# Patient Record
Sex: MALE | Race: WHITE | NOT HISPANIC OR LATINO | Employment: PART TIME | URBAN - METROPOLITAN AREA
[De-identification: names, ages, dates, MRNs, and addresses within clinical notes are randomized per-mention and may not be internally consistent; named-entity substitution may affect disease eponyms.]

---

## 2017-01-03 ENCOUNTER — TRANSFERRED RECORDS (OUTPATIENT)
Dept: HEALTH INFORMATION MANAGEMENT | Facility: CLINIC | Age: 72
End: 2017-01-03

## 2017-02-14 ENCOUNTER — HOSPITAL ENCOUNTER (OUTPATIENT)
Facility: CLINIC | Age: 72
End: 2017-02-14
Attending: OPHTHALMOLOGY | Admitting: OPHTHALMOLOGY
Payer: MEDICARE

## 2017-03-23 ENCOUNTER — TRANSFERRED RECORDS (OUTPATIENT)
Dept: HEALTH INFORMATION MANAGEMENT | Facility: CLINIC | Age: 72
End: 2017-03-23

## 2017-04-05 ENCOUNTER — ANESTHESIA EVENT (OUTPATIENT)
Dept: SURGERY | Facility: CLINIC | Age: 72
End: 2017-04-05
Payer: MEDICARE

## 2017-04-05 ENCOUNTER — ANESTHESIA (OUTPATIENT)
Dept: SURGERY | Facility: CLINIC | Age: 72
End: 2017-04-05
Payer: MEDICARE

## 2017-04-05 ENCOUNTER — HOSPITAL ENCOUNTER (OUTPATIENT)
Facility: CLINIC | Age: 72
Discharge: HOME | End: 2017-04-05
Attending: OPHTHALMOLOGY | Admitting: OPHTHALMOLOGY
Payer: MEDICARE

## 2017-04-05 ENCOUNTER — SURGERY (OUTPATIENT)
Age: 72
End: 2017-04-05

## 2017-04-05 ENCOUNTER — APPOINTMENT (OUTPATIENT)
Dept: ADMISSION | Facility: CLINIC | Age: 72
End: 2017-04-05
Attending: OPHTHALMOLOGY
Payer: COMMERCIAL

## 2017-04-05 VITALS
TEMPERATURE: 97.6 F | WEIGHT: 204 LBS | RESPIRATION RATE: 16 BRPM | HEART RATE: 79 BPM | SYSTOLIC BLOOD PRESSURE: 132 MMHG | OXYGEN SATURATION: 94 % | HEIGHT: 68 IN | DIASTOLIC BLOOD PRESSURE: 82 MMHG | BODY MASS INDEX: 30.92 KG/M2

## 2017-04-05 PROCEDURE — V2787 ASTIGMATISM-CORRECT FUNCTION: HCPCS | Performed by: OPHTHALMOLOGY

## 2017-04-05 PROCEDURE — 27210794 ZZH OR GENERAL SUPPLY STERILE: Performed by: OPHTHALMOLOGY

## 2017-04-05 PROCEDURE — 25000128 H RX IP 250 OP 636: Performed by: NURSE ANESTHETIST, CERTIFIED REGISTERED

## 2017-04-05 PROCEDURE — 25800025 ZZH RX 258: Performed by: ANESTHESIOLOGY

## 2017-04-05 PROCEDURE — 71000028 ZZH EYE RECOVERY PHASE 2 EACH 15 MINS: Performed by: OPHTHALMOLOGY

## 2017-04-05 PROCEDURE — 37000008 ZZH ANESTHESIA TECHNICAL FEE, 1ST 30 MIN: Performed by: OPHTHALMOLOGY

## 2017-04-05 PROCEDURE — 25000132 ZZH RX MED GY IP 250 OP 250 PS 637: Mod: GY | Performed by: OPHTHALMOLOGY

## 2017-04-05 PROCEDURE — 40000170 ZZH STATISTIC PRE-PROCEDURE ASSESSMENT II: Performed by: OPHTHALMOLOGY

## 2017-04-05 PROCEDURE — 25000125 ZZHC RX 250: Performed by: OPHTHALMOLOGY

## 2017-04-05 PROCEDURE — 36000135 ZZH KERATOTOMY ARCUATE W FEMTOSECOND LASER/IMAGING FOR ATIOL: Performed by: OPHTHALMOLOGY

## 2017-04-05 PROCEDURE — V2632 POST CHMBR INTRAOCULAR LENS: HCPCS | Performed by: OPHTHALMOLOGY

## 2017-04-05 PROCEDURE — 25000125 ZZHC RX 250: Performed by: ANESTHESIOLOGY

## 2017-04-05 PROCEDURE — 36000101 ZZH EYE SURGERY LEVEL 3 1ST 30 MIN: Performed by: OPHTHALMOLOGY

## 2017-04-05 DEVICE — IMPLANTABLE DEVICE: Type: IMPLANTABLE DEVICE | Site: EYE | Status: FUNCTIONAL

## 2017-04-05 RX ORDER — BALANCED SALT SOLUTION 6.4; .75; .48; .3; 3.9; 1.7 MG/ML; MG/ML; MG/ML; MG/ML; MG/ML; MG/ML
SOLUTION OPHTHALMIC PRN
Status: DISCONTINUED | OUTPATIENT
Start: 2017-04-05 | End: 2017-04-05 | Stop reason: HOSPADM

## 2017-04-05 RX ORDER — DICLOFENAC SODIUM 1 MG/ML
1 SOLUTION/ DROPS OPHTHALMIC
Status: COMPLETED | OUTPATIENT
Start: 2017-04-05 | End: 2017-04-05

## 2017-04-05 RX ORDER — PHENYLEPHRINE HYDROCHLORIDE 25 MG/ML
1 SOLUTION/ DROPS OPHTHALMIC
Status: COMPLETED | OUTPATIENT
Start: 2017-04-05 | End: 2017-04-05

## 2017-04-05 RX ORDER — MOXIFLOXACIN 5 MG/ML
1 SOLUTION/ DROPS OPHTHALMIC
Status: COMPLETED | OUTPATIENT
Start: 2017-04-05 | End: 2017-04-05

## 2017-04-05 RX ORDER — PROPARACAINE HYDROCHLORIDE 5 MG/ML
1 SOLUTION/ DROPS OPHTHALMIC ONCE
Status: DISCONTINUED | OUTPATIENT
Start: 2017-04-05 | End: 2017-04-05 | Stop reason: HOSPADM

## 2017-04-05 RX ORDER — PHENYLEPHRINE HYDROCHLORIDE 25 MG/ML
1 SOLUTION/ DROPS OPHTHALMIC
Status: DISCONTINUED | OUTPATIENT
Start: 2017-04-05 | End: 2017-04-05 | Stop reason: HOSPADM

## 2017-04-05 RX ORDER — SODIUM CHLORIDE, SODIUM LACTATE, POTASSIUM CHLORIDE, CALCIUM CHLORIDE 600; 310; 30; 20 MG/100ML; MG/100ML; MG/100ML; MG/100ML
500 INJECTION, SOLUTION INTRAVENOUS CONTINUOUS
Status: DISCONTINUED | OUTPATIENT
Start: 2017-04-05 | End: 2017-04-05 | Stop reason: HOSPADM

## 2017-04-05 RX ORDER — PROPARACAINE HYDROCHLORIDE 5 MG/ML
1 SOLUTION/ DROPS OPHTHALMIC ONCE
Status: COMPLETED | OUTPATIENT
Start: 2017-04-05 | End: 2017-04-05

## 2017-04-05 RX ORDER — DICLOFENAC SODIUM 1 MG/ML
1 SOLUTION/ DROPS OPHTHALMIC
Status: DISCONTINUED | OUTPATIENT
Start: 2017-04-05 | End: 2017-04-05 | Stop reason: HOSPADM

## 2017-04-05 RX ORDER — LIDOCAINE HYDROCHLORIDE 10 MG/ML
INJECTION, SOLUTION EPIDURAL; INFILTRATION; INTRACAUDAL; PERINEURAL PRN
Status: DISCONTINUED | OUTPATIENT
Start: 2017-04-05 | End: 2017-04-05 | Stop reason: HOSPADM

## 2017-04-05 RX ORDER — MOXIFLOXACIN 5 MG/ML
1 SOLUTION/ DROPS OPHTHALMIC
Status: DISCONTINUED | OUTPATIENT
Start: 2017-04-05 | End: 2017-04-05 | Stop reason: HOSPADM

## 2017-04-05 RX ORDER — TROPICAMIDE 10 MG/ML
1 SOLUTION/ DROPS OPHTHALMIC
Status: DISCONTINUED | OUTPATIENT
Start: 2017-04-05 | End: 2017-04-05 | Stop reason: HOSPADM

## 2017-04-05 RX ORDER — TROPICAMIDE 10 MG/ML
1 SOLUTION/ DROPS OPHTHALMIC
Status: COMPLETED | OUTPATIENT
Start: 2017-04-05 | End: 2017-04-05

## 2017-04-05 RX ORDER — ONDANSETRON 2 MG/ML
INJECTION INTRAMUSCULAR; INTRAVENOUS PRN
Status: DISCONTINUED | OUTPATIENT
Start: 2017-04-05 | End: 2017-04-05

## 2017-04-05 RX ADMIN — DICLOFENAC SODIUM 1 DROP: 1 SOLUTION/ DROPS OPHTHALMIC at 08:22

## 2017-04-05 RX ADMIN — PHENYLEPHRINE HYDROCHLORIDE 1 DROP: 2.5 SOLUTION/ DROPS OPHTHALMIC at 08:22

## 2017-04-05 RX ADMIN — MOXIFLOXACIN HYDROCHLORIDE 1 DROP: 5 SOLUTION/ DROPS OPHTHALMIC at 08:12

## 2017-04-05 RX ADMIN — LIDOCAINE HYDROCHLORIDE 0.3 ML: 10 INJECTION, SOLUTION EPIDURAL; INFILTRATION; INTRACAUDAL; PERINEURAL at 08:23

## 2017-04-05 RX ADMIN — PHENYLEPHRINE HYDROCHLORIDE 1 DROP: 2.5 SOLUTION/ DROPS OPHTHALMIC at 08:17

## 2017-04-05 RX ADMIN — MOXIFLOXACIN HYDROCHLORIDE 1 DROP: 5 SOLUTION/ DROPS OPHTHALMIC at 08:22

## 2017-04-05 RX ADMIN — POVIDONE-IODINE 1 DROP: 5 SOLUTION OPHTHALMIC at 09:21

## 2017-04-05 RX ADMIN — DICLOFENAC SODIUM 1 DROP: 1 SOLUTION/ DROPS OPHTHALMIC at 08:12

## 2017-04-05 RX ADMIN — PROPARACAINE HYDROCHLORIDE 1 DROP: 5 SOLUTION/ DROPS OPHTHALMIC at 08:12

## 2017-04-05 RX ADMIN — EPINEPHRINE 500 ML: 1 INJECTION INTRAMUSCULAR; INTRAVENOUS; SUBCUTANEOUS at 09:41

## 2017-04-05 RX ADMIN — TROPICAMIDE 1 DROP: 10 SOLUTION/ DROPS OPHTHALMIC at 08:22

## 2017-04-05 RX ADMIN — DICLOFENAC SODIUM 1 DROP: 1 SOLUTION/ DROPS OPHTHALMIC at 08:17

## 2017-04-05 RX ADMIN — LIDOCAINE HYDROCHLORIDE 1 ML: 10 INJECTION, SOLUTION EPIDURAL; INFILTRATION; INTRACAUDAL; PERINEURAL at 09:41

## 2017-04-05 RX ADMIN — TROPICAMIDE 1 DROP: 10 SOLUTION/ DROPS OPHTHALMIC at 08:17

## 2017-04-05 RX ADMIN — BALANCED SALT SOLUTION 15 ML: 6.4; .75; .48; .3; 3.9; 1.7 SOLUTION OPHTHALMIC at 09:41

## 2017-04-05 RX ADMIN — BALANCED SALT SOLUTION 15 ML: 6.4; .75; .48; .3; 3.9; 1.7 SOLUTION OPHTHALMIC at 09:27

## 2017-04-05 RX ADMIN — PHENYLEPHRINE HYDROCHLORIDE 1 DROP: 2.5 SOLUTION/ DROPS OPHTHALMIC at 08:12

## 2017-04-05 RX ADMIN — ONDANSETRON 4 MG: 2 INJECTION INTRAMUSCULAR; INTRAVENOUS at 09:33

## 2017-04-05 RX ADMIN — TROPICAMIDE 1 DROP: 10 SOLUTION/ DROPS OPHTHALMIC at 08:12

## 2017-04-05 RX ADMIN — MIDAZOLAM HYDROCHLORIDE 2 MG: 1 INJECTION, SOLUTION INTRAMUSCULAR; INTRAVENOUS at 09:33

## 2017-04-05 RX ADMIN — SODIUM CHLORIDE, SODIUM LACTATE, POTASSIUM CHLORIDE, CALCIUM CHLORIDE: 600; 310; 30; 20 INJECTION, SOLUTION INTRAVENOUS at 09:33

## 2017-04-05 RX ADMIN — LIDOCAINE HYDROCHLORIDE 0.5 ML: 35 GEL OPHTHALMIC at 09:41

## 2017-04-05 RX ADMIN — MOXIFLOXACIN HYDROCHLORIDE 1 DROP: 5 SOLUTION/ DROPS OPHTHALMIC at 08:17

## 2017-04-05 RX ADMIN — SODIUM CHONDROITIN SULFATE / SODIUM HYALURONATE 1 ML: 0.55-0.5 INJECTION INTRAOCULAR at 09:42

## 2017-04-05 ASSESSMENT — LIFESTYLE VARIABLES: TOBACCO_USE: 0

## 2017-04-05 NOTE — DISCHARGE INSTRUCTIONS
[facility] Discharge  Instructions  Anesthesia ([facility] Care Center)   Adult Discharge Instructions    For 24 hours after surgery    1. Get plenty of rest.  Make arrangements to have a responsible adult stay with you for at least 6 hours after you leave the hospital.  2. Do not drive or use heavy equipment for 24 hours.    3. Do not drink alcohol for 24 hours.  4. Do not sign legal documents or make important decisions for 24 hours.  5. Avoid strenuous or risky activities. You may feel lightheaded.  If so, sit for a few minutes before standing.  Have someone help you get up.   6. Conscious sedation patients may resume a regular diet..  7. Any questions of medical nature, call your physician.        POST-OPERATIVE CARE FOLLOWING CATARACT SURGERY    DR. JT BROWNLEE  [facility]  [phone]    Postoperative medications: After surgery, you will use several different eye drop medications. You may have either the brand specific form or generic of each type used.     Begin your eye drops today as directed.  You should instill the drop, close the eye without blinking and keep closed for 3 minutes allowing the drop to absorb.  It is fine to instill all 3 of the drops consecutively, waiting the 3 minutes in between each one. Place the shield for sleep.  In the morning, instill 1 drop of all 3 eye drops before your post-op appointment.      Antibiotic    Ofloxacin - this generic antibiotic is to be used 4 times a day for one week, you can start using this drop after you get home, instilling 3 separate times on the day of surgery and then 4 times a day there after.     Anti-inflammatory     Ketorolac -this generic drop should be used 4 times daily until gone. You can start your drops when you get home, instilling 3 separate times on the day of surgery.     Steroid    Prednisolone - the generic form should be used 4 times daily for 3 weeks or until gone. You can start your first drop  after you get home, instilling 3 separate times on the day of surgery.        Do not rub the operated eye. Wear the eye shield during sleeping hours for one week.      Light sensitivity may be noticed. Sunglasses may be worn for comfort.      Some discomfort and irritation may be noticed. Acetaminophen (Tylenol) or Ibuprofen (Advil) may be taken for discomfort.      Avoid bending over, strenuous activity or heavy lifting for one week.      Keep the operated eye dry.      You may wash your hair, bathe or shower, but keep the operated eye closed while doing so.      Use medication exactly as prescribed by your doctor.  You may restart your regular home medications.      Bring all materials and medications to the clinic on your first post-operative visit.      Call the doctor s office if any of the following should occur:  -  any sudden vision change  -  nausea or severe headache  -  increase in pain not controlled  -  increased amount of floaters (black spots in front of vision)         -  or signs of infection (pus, increasing redness or tenderness)            Revised 12/10/2015

## 2017-04-05 NOTE — ANESTHESIA PREPROCEDURE EVALUATION
Anesthesia Evaluation     . Pt has had prior anesthetic.     No history of anesthetic complications          ROS/MED HX    ENT/Pulmonary:     (+)sleep apnea, uses CPAP , . .   (-) tobacco use   Neurologic:       Cardiovascular:     (+) hypertension----. : . . . :. .       METS/Exercise Tolerance:     Hematologic:         Musculoskeletal:         GI/Hepatic:        (-) GERD and liver disease   Renal/Genitourinary:      (-) renal disease   Endo:     (+) Obesity, .   (-) Type II DM, thyroid disease and chronic steroid usage   Psychiatric:         Infectious Disease:         Malignancy:   (+) Malignancy History of Prostate  Prostate CA Active status post,         Other:                     Physical Exam  Normal systems: cardiovascular, pulmonary and dental    Airway   Mallampati: II  TM distance: >3 FB  Neck ROM: full    Dental     Cardiovascular       Pulmonary                     Anesthesia Plan      History & Physical Review  History and physical reviewed and following examination; no interval change.    ASA Status:  3 .    NPO Status:  > 8 hours    Plan for MAC Reason for MAC:  Procedure to face, neck, head or breast  PONV prophylaxis:  Ondansetron (or other 5HT-3)       Postoperative Care  Postoperative pain management:  Multi-modal analgesia.      Consents  Anesthetic plan, risks, benefits and alternatives discussed with:  Patient..                          .

## 2017-04-05 NOTE — IP AVS SNAPSHOT
[facility]    [address]    [place] [address]    Phone:  [phone]    Fax:  [phone]                                       After Visit Summary   4/5/2017    David Nicole    MRN: [id number]           After Visit Summary Signature Page     I have received my discharge instructions, and my questions have been answered. I have discussed any challenges I see with this plan with the nurse or doctor.    ..........................................................................................................................................  Patient/Patient Representative Signature      ..........................................................................................................................................  Patient Representative Print Name and Relationship to Patient    ..................................................               ................................................  Date                                            Time    ..........................................................................................................................................  Reviewed by Signature/Title    ...................................................              ..............................................  Date                                                            Time

## 2017-04-05 NOTE — OP NOTE
[facility]  Ophthalmology Operative Note    PREOPERATIVE DIAGNOSIS:  Dense cataract of the Right eye.       POSTOPERATIVE DIAGNOSIS:  Dense cataract of the Right eye.       OPERATION:  Femtosecond Laser Assisted Clear corneal phacoemulsification with intraocular lens implant of the Right eye.       PROCEDURE:  When the laser portion of the procedure was completed, David Nicole was brought into the operating room with a topical anesthetic.  Under the microscope after a sterile ophthalmic prep, a lid speculum was put into place.  Anterior chamber was entered with a #75 blade.  Anterior chamber was then filled with lidocaine solution and a viscoelastic material.  A keratome blade was then used to fashion a 2.6 mm temporal incision in the corneoscleral junction and the previously formed anterior capsulorrhexis was easily removed.  The lens was carefully hydrodissected.  The phacoemulsification tip was then introduced into the eye and a central groove fashioned.  The nucleus was split in half and then quartered, and nuclear material was aspirated.  The irrigation-aspiration apparatus was then utilized to clear the remaining cortical material.  The posterior capsule was polished and a foldable posterior chamber intraocular lens was then introduced into the capsular bag, unfolded, and rotated into the horizontal position.  No sutures were necessary to close the incision site.  The viscoelastic material was aspirated.  Drops of Moxeza and Prolensa were used on the surface of the eye and a clear shield was put over the eye before the patient was dismissed from the operating room.  The patient tolerated the procedure without difficulty.       Implant Name Type Inv. Item Serial No.  Lot No. LRB No. Used   EYE IMP IOL FREDERICK 1-PIECE TECNIS TORIC +17.0 [id number] Lens/Eye Implant EYE IMP IOL FREDERICK 1-PIECE TECNIS TORIC +17.0 [id number] [employer]   Right 1           JT MARTINEZ  MD TORRIE

## 2017-04-05 NOTE — ANESTHESIA POSTPROCEDURE EVALUATION
Patient: David Nicole    Procedure(s):  RIGHT EYE FEMTO ASSISTED PHACOEMULSIFICATION CLEAR CORNEA WITH TORIC  INTRAOCULAR LENS IMPLANT  - Wound Class: I-Clean    Diagnosis:RIGHT EYE CATARACT  Diagnosis Additional Information: No value filed.    Anesthesia Type:  MAC    Note:  Anesthesia Post Evaluation    Patient location during evaluation: PACU  Patient participation: Able to fully participate in evaluation  Level of consciousness: awake and alert  Pain management: satisfactory to patient  Airway patency: patent  Cardiovascular status: hemodynamically stable  Respiratory status: acceptable and unassisted  Hydration status: balanced  PONV: none     Anesthetic complications: None          Last vitals:  Vitals:    04/05/17 0829 04/05/17 0957 04/05/17 1017   BP: 148/86 135/90 132/82   Pulse: 79     Resp: 16 16 16   Temp: 36.4  C (97.6  F)     SpO2: 94% 95% 94%         Electronically Signed By: Kingston Recinos MD  April 5, 2017  10:35 AM

## 2017-04-05 NOTE — ANESTHESIA CARE TRANSFER NOTE
Patient: David Nicole    Procedure(s):  RIGHT EYE FEMTO ASSISTED PHACOEMULSIFICATION CLEAR CORNEA WITH TORIC  INTRAOCULAR LENS IMPLANT  - Wound Class: I-Clean    Diagnosis: RIGHT EYE CATARACT  Diagnosis Additional Information: No value filed.    Anesthesia Type:   MAC     Note:  Airway :Room Air  Patient transferred to:PACU  Comments: Transferred to EC PACU, spontaneous RR, on room air.  Monitors and alarms on and functioning, VSS, patient awake and comfortable.  Report to EC PACU RN.      Vitals: (Last set prior to Anesthesia Care Transfer)    CRNA VITALS  4/5/2017 0920 - 4/5/2017 0952      4/5/2017             Pulse: 58    Ht Rate: 57    SpO2: 97 %    Resp Rate (set): 10                Electronically Signed By: DEE DEE Wilson CRNA  April 5, 2017  9:52 AM

## 2017-04-05 NOTE — IP AVS SNAPSHOT
MRN:[id number]                      After Visit Summary   4/5/2017    David Nicole    MRN: [id number]           Thank you!     Thank you for choosing [facility] for your care. Our goal is always to provide you with excellent care. Hearing back from our patients is one way we can continue to improve our services. Please take a few minutes to complete the written survey that you may receive in the mail after you visit with us. Thank you!        Patient Information     Date Of Birth          1945        About your hospital stay     You were admitted on:  April 5, 2017 You last received care in the:  [facility]    You were discharged on:  April 5, 2017       Who to Call     For medical emergencies, please call 911.  For non-urgent questions about your medical care, please call your primary care provider or clinic, [phone]  For questions related to your surgery, please call your surgery clinic        Attending Provider     Provider Specialty    Harrison George MD Ophthalmology       Primary Care Provider Office Phone # Fax #    Silviano Arnold [phone]       [facility] [address] [facility] [address]        Your next 10 appointments already scheduled     Apr 26, 2017   Procedure with Harrison George MD   [facility] (--)    [address]  [facility] [id number] [phone]            Apr 26, 2017   Procedure with Harrison George MD   [facility] (--)    [address]  [facility] [id number] [phone]              Further instructions from your care team       [facility] Discharge  Instructions  Anesthesia ([facility])   Adult Discharge Instructions    For 24 hours after surgery    1. Get plenty of rest.  Make arrangements to have a responsible adult stay with you for at least 6 hours after you leave the  hospital.  2. Do not drive or use heavy equipment for 24 hours.    3. Do not drink alcohol for 24 hours.  4. Do not sign legal documents or make important decisions for 24 hours.  5. Avoid strenuous or risky activities. You may feel lightheaded.  If so, sit for a few minutes before standing.  Have someone help you get up.   6. Conscious sedation patients may resume a regular diet..  7. Any questions of medical nature, call your physician.        POST-OPERATIVE CARE FOLLOWING CATARACT SURGERY    DR. JT BROWNLEE  [facility]  [phone]    Postoperative medications: After surgery, you will use several different eye drop medications. You may have either the brand specific form or generic of each type used.     Begin your eye drops today as directed.  You should instill the drop, close the eye without blinking and keep closed for 3 minutes allowing the drop to absorb.  It is fine to instill all 3 of the drops consecutively, waiting the 3 minutes in between each one. Place the shield for sleep.  In the morning, instill 1 drop of all 3 eye drops before your post-op appointment.      Antibiotic    Ofloxacin - this generic antibiotic is to be used 4 times a day for one week, you can start using this drop after you get home, instilling 3 separate times on the day of surgery and then 4 times a day there after.     Anti-inflammatory     Ketorolac -this generic drop should be used 4 times daily until gone. You can start your drops when you get home, instilling 3 separate times on the day of surgery.     Steroid    Prednisolone - the generic form should be used 4 times daily for 3 weeks or until gone. You can start your first drop after you get home, instilling 3 separate times on the day of surgery.        Do not rub the operated eye. Wear the eye shield during sleeping hours for one week.      Light sensitivity may be noticed. Sunglasses may be worn for comfort.      Some discomfort and irritation may be noticed.  "Acetaminophen (Tylenol) or Ibuprofen (Advil) may be taken for discomfort.      Avoid bending over, strenuous activity or heavy lifting for one week.      Keep the operated eye dry.      You may wash your hair, bathe or shower, but keep the operated eye closed while doing so.      Use medication exactly as prescribed by your doctor.  You may restart your regular home medications.      Bring all materials and medications to the clinic on your first post-operative visit.      Call the doctor s office if any of the following should occur:  -  any sudden vision change  -  nausea or severe headache  -  increase in pain not controlled  -  increased amount of floaters (black spots in front of vision)         -  or signs of infection (pus, increasing redness or tenderness)            Revised 12/10/2015    Pending Results     No orders found from 4/3/2017 to 2017.            Admission Information     Date & Time Provider Department Dept. Phone    2017 Harrison George MD [facility] [phone]      Your Vitals Were     Blood Pressure Pulse Temperature Respirations Height Weight    135/90 79 97.6  F (36.4  C) (Temporal) 16 1.727 m (5' 8\") 92.5 kg (204 lb)    Pulse Oximetry BMI (Body Mass Index)                95% 31.02 kg/m2          MyChart Information     [employer] lets you send messages to your doctor, view your test results, renew your prescriptions, schedule appointments and more. To sign up, go to [email or web address] . Click on \"Log in\" on the left side of the screen, which will take you to the Welcome page. Then click on \"Sign up Now\" on the right side of the page.     You will be asked to enter the access code listed below, as well as some personal information. Please follow the directions to create your username and password.     Your access code is: [email or web address]  Expires: 2017 10:02 AM     Your access code will  in 90 days. If you need help or a new code, please call your " [facility] or [phone].        Care EveryWhere ID     This is your Care EveryWhere ID. This could be used by other organizations to access your [facility] medical records  [id number]           Review of your medicines      UNREVIEWED medicines. Ask your doctor about these medicines        Dose / Directions    AMBIEN PO        Dose:  10 mg   Take 10 mg by mouth At Bedtime   Refills:  0       DAILY MULTIVITAMIN PO        Take  by mouth.   Refills:  0       FOSAMAX PO        Dose:  70 mg   Take 70 mg by mouth   Refills:  0       VIBRAMYCIN PO        Dose:  50 mg   Take 50 mg by mouth daily   Refills:  0       vitamin D 2000 UNITS tablet        Dose:  1 tablet   Take 1 tablet by mouth daily.   Refills:  0                Protect others around you: Learn how to safely use, store and throw away your medicines at www.disposemymeds.org.             Medication List: This is a list of all your medications and when to take them. Check marks below indicate your daily home schedule. Keep this list as a reference.      Medications           Morning Afternoon Evening Bedtime As Needed    AMBIEN PO   Take 10 mg by mouth At Bedtime                                DAILY MULTIVITAMIN PO   Take  by mouth.                                FOSAMAX PO   Take 70 mg by mouth                                VIBRAMYCIN PO   Take 50 mg by mouth daily                                vitamin D 2000 UNITS tablet   Take 1 tablet by mouth daily.

## 2017-04-26 ENCOUNTER — HOSPITAL ENCOUNTER (OUTPATIENT)
Facility: CLINIC | Age: 72
Discharge: HOME | End: 2017-04-26
Attending: OPHTHALMOLOGY | Admitting: OPHTHALMOLOGY
Payer: MEDICARE

## 2017-04-26 ENCOUNTER — ANESTHESIA EVENT (OUTPATIENT)
Dept: SURGERY | Facility: CLINIC | Age: 72
End: 2017-04-26
Payer: MEDICARE

## 2017-04-26 ENCOUNTER — APPOINTMENT (OUTPATIENT)
Dept: ADMISSION | Facility: CLINIC | Age: 72
End: 2017-04-26
Attending: OPHTHALMOLOGY
Payer: COMMERCIAL

## 2017-04-26 ENCOUNTER — ANESTHESIA (OUTPATIENT)
Dept: SURGERY | Facility: CLINIC | Age: 72
End: 2017-04-26
Payer: MEDICARE

## 2017-04-26 VITALS
SYSTOLIC BLOOD PRESSURE: 116 MMHG | OXYGEN SATURATION: 95 % | TEMPERATURE: 97 F | RESPIRATION RATE: 16 BRPM | DIASTOLIC BLOOD PRESSURE: 79 MMHG

## 2017-04-26 PROCEDURE — 25800025 ZZH RX 258: Performed by: ANESTHESIOLOGY

## 2017-04-26 PROCEDURE — 36000135 ZZH KERATOTOMY ARCUATE W FEMTOSECOND LASER/IMAGING FOR ATIOL: Performed by: OPHTHALMOLOGY

## 2017-04-26 PROCEDURE — 40000170 ZZH STATISTIC PRE-PROCEDURE ASSESSMENT II: Performed by: OPHTHALMOLOGY

## 2017-04-26 PROCEDURE — V2632 POST CHMBR INTRAOCULAR LENS: HCPCS | Performed by: OPHTHALMOLOGY

## 2017-04-26 PROCEDURE — 25000132 ZZH RX MED GY IP 250 OP 250 PS 637: Mod: GY | Performed by: OPHTHALMOLOGY

## 2017-04-26 PROCEDURE — V2787 ASTIGMATISM-CORRECT FUNCTION: HCPCS | Performed by: OPHTHALMOLOGY

## 2017-04-26 PROCEDURE — 25000125 ZZHC RX 250: Performed by: OPHTHALMOLOGY

## 2017-04-26 PROCEDURE — 36000101 ZZH EYE SURGERY LEVEL 3 1ST 30 MIN: Performed by: OPHTHALMOLOGY

## 2017-04-26 PROCEDURE — 25000125 ZZHC RX 250: Performed by: ANESTHESIOLOGY

## 2017-04-26 PROCEDURE — 25000128 H RX IP 250 OP 636: Performed by: NURSE ANESTHETIST, CERTIFIED REGISTERED

## 2017-04-26 PROCEDURE — 27210794 ZZH OR GENERAL SUPPLY STERILE: Performed by: OPHTHALMOLOGY

## 2017-04-26 PROCEDURE — 37000008 ZZH ANESTHESIA TECHNICAL FEE, 1ST 30 MIN: Performed by: OPHTHALMOLOGY

## 2017-04-26 PROCEDURE — 71000028 ZZH EYE RECOVERY PHASE 2 EACH 15 MINS: Performed by: OPHTHALMOLOGY

## 2017-04-26 DEVICE — IMPLANTABLE DEVICE: Type: IMPLANTABLE DEVICE | Site: EYE | Status: FUNCTIONAL

## 2017-04-26 RX ORDER — BALANCED SALT SOLUTION 6.4; .75; .48; .3; 3.9; 1.7 MG/ML; MG/ML; MG/ML; MG/ML; MG/ML; MG/ML
SOLUTION OPHTHALMIC PRN
Status: DISCONTINUED | OUTPATIENT
Start: 2017-04-26 | End: 2017-04-26 | Stop reason: HOSPADM

## 2017-04-26 RX ORDER — DICLOFENAC SODIUM 1 MG/ML
1 SOLUTION/ DROPS OPHTHALMIC
Status: COMPLETED | OUTPATIENT
Start: 2017-04-26 | End: 2017-04-26

## 2017-04-26 RX ORDER — PROPARACAINE HYDROCHLORIDE 5 MG/ML
1 SOLUTION/ DROPS OPHTHALMIC ONCE
Status: COMPLETED | OUTPATIENT
Start: 2017-04-26 | End: 2017-04-26

## 2017-04-26 RX ORDER — TROPICAMIDE 10 MG/ML
1 SOLUTION/ DROPS OPHTHALMIC
Status: COMPLETED | OUTPATIENT
Start: 2017-04-26 | End: 2017-04-26

## 2017-04-26 RX ORDER — PHENYLEPHRINE HYDROCHLORIDE 25 MG/ML
1 SOLUTION/ DROPS OPHTHALMIC
Status: COMPLETED | OUTPATIENT
Start: 2017-04-26 | End: 2017-04-26

## 2017-04-26 RX ORDER — PROPARACAINE HYDROCHLORIDE 5 MG/ML
1 SOLUTION/ DROPS OPHTHALMIC ONCE
Status: DISCONTINUED | OUTPATIENT
Start: 2017-04-26 | End: 2017-04-26 | Stop reason: HOSPADM

## 2017-04-26 RX ORDER — PROPARACAINE HYDROCHLORIDE 5 MG/ML
SOLUTION/ DROPS OPHTHALMIC PRN
Status: DISCONTINUED | OUTPATIENT
Start: 2017-04-26 | End: 2017-04-26 | Stop reason: HOSPADM

## 2017-04-26 RX ORDER — MOXIFLOXACIN 5 MG/ML
1 SOLUTION/ DROPS OPHTHALMIC
Status: COMPLETED | OUTPATIENT
Start: 2017-04-26 | End: 2017-04-26

## 2017-04-26 RX ORDER — ONDANSETRON 2 MG/ML
INJECTION INTRAMUSCULAR; INTRAVENOUS PRN
Status: DISCONTINUED | OUTPATIENT
Start: 2017-04-26 | End: 2017-04-26

## 2017-04-26 RX ORDER — LIDOCAINE HYDROCHLORIDE 10 MG/ML
INJECTION, SOLUTION EPIDURAL; INFILTRATION; INTRACAUDAL; PERINEURAL PRN
Status: DISCONTINUED | OUTPATIENT
Start: 2017-04-26 | End: 2017-04-26 | Stop reason: HOSPADM

## 2017-04-26 RX ORDER — SODIUM CHLORIDE, SODIUM LACTATE, POTASSIUM CHLORIDE, CALCIUM CHLORIDE 600; 310; 30; 20 MG/100ML; MG/100ML; MG/100ML; MG/100ML
500 INJECTION, SOLUTION INTRAVENOUS CONTINUOUS
Status: DISCONTINUED | OUTPATIENT
Start: 2017-04-26 | End: 2017-04-26 | Stop reason: HOSPADM

## 2017-04-26 RX ADMIN — PROPARACAINE HYDROCHLORIDE 1 DROP: 5 SOLUTION/ DROPS OPHTHALMIC at 08:30

## 2017-04-26 RX ADMIN — SODIUM CHLORIDE, SODIUM LACTATE, POTASSIUM CHLORIDE, CALCIUM CHLORIDE: 600; 310; 30; 20 INJECTION, SOLUTION INTRAVENOUS at 09:08

## 2017-04-26 RX ADMIN — LIDOCAINE HYDROCHLORIDE 1 ML: 10 INJECTION, SOLUTION EPIDURAL; INFILTRATION; INTRACAUDAL; PERINEURAL at 08:46

## 2017-04-26 RX ADMIN — TROPICAMIDE 1 DROP: 10 SOLUTION/ DROPS OPHTHALMIC at 08:35

## 2017-04-26 RX ADMIN — MOXIFLOXACIN HYDROCHLORIDE 1 DROP: 5 SOLUTION/ DROPS OPHTHALMIC at 08:35

## 2017-04-26 RX ADMIN — DICLOFENAC SODIUM 1 DROP: 1 SOLUTION/ DROPS OPHTHALMIC at 08:46

## 2017-04-26 RX ADMIN — MIDAZOLAM HYDROCHLORIDE 2 MG: 1 INJECTION, SOLUTION INTRAMUSCULAR; INTRAVENOUS at 09:08

## 2017-04-26 RX ADMIN — PHENYLEPHRINE HYDROCHLORIDE 1 DROP: 2.5 SOLUTION/ DROPS OPHTHALMIC at 08:46

## 2017-04-26 RX ADMIN — PHENYLEPHRINE HYDROCHLORIDE 1 DROP: 2.5 SOLUTION/ DROPS OPHTHALMIC at 08:30

## 2017-04-26 RX ADMIN — MOXIFLOXACIN HYDROCHLORIDE 1 DROP: 5 SOLUTION/ DROPS OPHTHALMIC at 08:46

## 2017-04-26 RX ADMIN — ONDANSETRON 4 MG: 2 INJECTION INTRAMUSCULAR; INTRAVENOUS at 09:16

## 2017-04-26 RX ADMIN — TROPICAMIDE 1 DROP: 10 SOLUTION/ DROPS OPHTHALMIC at 08:30

## 2017-04-26 RX ADMIN — TROPICAMIDE 1 DROP: 10 SOLUTION/ DROPS OPHTHALMIC at 08:46

## 2017-04-26 RX ADMIN — PHENYLEPHRINE HYDROCHLORIDE 1 DROP: 2.5 SOLUTION/ DROPS OPHTHALMIC at 08:35

## 2017-04-26 RX ADMIN — DICLOFENAC SODIUM 1 DROP: 1 SOLUTION/ DROPS OPHTHALMIC at 08:30

## 2017-04-26 RX ADMIN — MOXIFLOXACIN HYDROCHLORIDE 1 DROP: 5 SOLUTION/ DROPS OPHTHALMIC at 08:30

## 2017-04-26 RX ADMIN — DICLOFENAC SODIUM 1 DROP: 1 SOLUTION/ DROPS OPHTHALMIC at 08:35

## 2017-04-26 ASSESSMENT — LIFESTYLE VARIABLES: TOBACCO_USE: 0

## 2017-04-26 NOTE — ANESTHESIA PREPROCEDURE EVALUATION
Procedure: Procedure(s):  PHACOEMULSIFICATION CLEAR CORNEA WITH TORIC INTRAOCULAR LENS IMPLANT  Preop diagnosis: LEFT EYE CATARACT  Allergies   Allergen Reactions     Lisinopril Cough     Patient Active Problem List   Diagnosis     Acne rosacea     Seborrheic keratosis     Seborrheic dermatitis     Blepharitis     HTN (hypertension)     Hernia     Tendon sheath thickening     Past Medical History:   Diagnosis Date     Allergic rhinitis      Floppy lid syndrome      Hernia      HTN (hypertension)      Obesity      Osteoporosis      Osteoporosis      Rosacea      Sleep apnea     uses CPAP     Past Surgical History:   Procedure Laterality Date     COMBINED REPAIR PTOSIS WITH BLEPHAROPLASTY BILATERAL Bilateral 10/22/2015    Procedure: COMBINED REPAIR PTOSIS WITH BLEPHAROPLASTY BILATERAL;  Surgeon: Ambrocio Downs MD;  Location: [facility]     EYE SURGERY       HC REMOVAL OF TONSILS,<11 Y/O       HERNIORRHAPHY UMBILICAL  4/26/2013    Procedure: HERNIORRHAPHY UMBILICAL;  UMBILICAL HERNIA REPAIR WITH MESH;  Surgeon: Peewee Lees MD;  Location: [facility]     KERATOTOMY ARCUATE WITH FEMTOSECOND LASER/IMAGING FOR ATIOL Right 4/5/2017    Procedure: KERATOTOMY ARCUATE WITH FEMTOSECOND LASER/IMAGING FOR ATIOL;  Surgeon: Harrison Georeg MD;  Location: [facility]     PHACOEMULSIFICATION CLEAR CORNEA WITH TORIC INTRAOCULAR LENS IMPLANT Right 4/5/2017    Procedure: PHACOEMULSIFICATION CLEAR CORNEA WITH TORIC INTRAOCULAR LENS IMPLANT;  Surgeon: Harrison George MD;  Location: [facility]       No current facility-administered medications on file prior to encounter.   Current Outpatient Prescriptions on File Prior to Encounter:  Doxycycline Monohydrate (VIBRAMYCIN PO) Take 50 mg by mouth daily   Alendronate Sodium (FOSAMAX PO) Take 70 mg by mouth   Zolpidem Tartrate (AMBIEN PO) Take 10 mg by mouth At Bedtime   Multiple Vitamin (DAILY MULTIVITAMIN PO) Take  by mouth.   Cholecalciferol (VITAMIN D) 2000 UNIT tablet Take 1 tablet by mouth  daily.     There were no vitals taken for this visit.      Anesthesia Evaluation     . Pt has had prior anesthetic.     No history of anesthetic complications          ROS/MED HX    ENT/Pulmonary:     (+)sleep apnea, allergic rhinitis, uses CPAP , . .   (-) tobacco use   Neurologic:       Cardiovascular:     (+) hypertension----. : . . . :. .       METS/Exercise Tolerance:     Hematologic:         Musculoskeletal: Comment: osteoporosis        GI/Hepatic:        (-) GERD and liver disease   Renal/Genitourinary:      (-) renal disease   Endo:     (+) Obesity, .   (-) Type II DM, thyroid disease and chronic steroid usage   Psychiatric:         Infectious Disease:         Malignancy:   (+) Malignancy History of Prostate  Prostate CA Active status post,         Other:                     Physical Exam  Normal systems: cardiovascular, pulmonary and dental    Airway   Mallampati: II  TM distance: >3 FB  Neck ROM: full    Dental     Cardiovascular   Rhythm and rate: regular and normal      Pulmonary    breath sounds clear to auscultation                    Anesthesia Plan      History & Physical Review  History and physical reviewed and following examination; no interval change.    ASA Status:  2 .    NPO Status:  > 8 hours    Plan for MAC Reason for MAC:  Procedure to face, neck, head or breast    S/p cataract surgery 4/15/2017      Postoperative Care  Postoperative pain management:  IV analgesics.      Consents  Anesthetic plan, risks, benefits and alternatives discussed with:  Patient..                          .

## 2017-04-26 NOTE — ANESTHESIA CARE TRANSFER NOTE
Patient: David Nicole    Procedure(s):  LEFT EYE FEMTOSECOND LASER ASSISTED PHACOEMULSIFICATION CLEAR CORNEA WITH TORIC INTRAOCULAR LENS IMPLANT  - Wound Class: I-Clean    Diagnosis: LEFT EYE CATARACT  Diagnosis Additional Information: No value filed.    Anesthesia Type:   MAC     Note:  Airway :Room Air  Patient transferred to:PACU  Comments: Pt exhibits spont resps, all monitors and alarms on in pacu, report given to RN, vss.      Vitals: (Last set prior to Anesthesia Care Transfer)    CRNA VITALS  4/26/2017 0856 - 4/26/2017 0932      4/26/2017             Pulse: 56    Ht Rate: 56    SpO2: 96 %    Resp Rate (set): 10                Electronically Signed By: DEE DEE Varela CRNA  April 26, 2017  9:32 AM

## 2017-04-26 NOTE — DISCHARGE INSTRUCTIONS
POST-OPERATIVE CARE FOLLOWING CATARACT SURGERY    DR. JT BROWNLEE  [facility]  [phone]    Postoperative medications: After surgery, you will use several different eye drop medications. You may have either the brand specific form or generic of each type used.     Begin your eye drops today as directed.  You should instill the drop, close the eye without blinking and keep closed for 3 minutes allowing the drop to absorb.  It is fine to instill all 3 of the drops consecutively, waiting the 3 minutes in between each one. Place the shield for sleep.  In the morning, instill 1 drop of all 3 eye drops before your post-op appointment.      Antibiotic  Moxeza - is an antibiotic drop that is used to minimize the risk of infection. Instill your first drop at bedtime tonight, then 2 times daily for one week.       -OR-    Ofloxacin - this generic antibiotic is to be used 4 times a day for one week, you can start using this drop after you get home, instilling 3 separate times on the day of surgery and then 4 times a day there after.     Anti-inflammatory   Prolensa - use it once daily until gone, beginning today.    -OR-    Ketorolac -this generic drop should be used 4 times daily until gone. You can start your drops when you get home, instilling 3 separate times on the day of surgery.     Steroid  Durezol - is a steroid drop used to minimize inflammation and modulate healing.  It should be used 2 times daily until gone, beginning with your first drop at bedtime tonight.    -OR-    Prednisolone - the generic form should be used 4 times daily for 3 weeks or until gone. You can start your first drop after you get home, instilling 3 separate times on the day of surgery.        Do not rub the operated eye. Wear the eye shield during sleeping hours for one week.      Light sensitivity may be noticed. Sunglasses may be worn for comfort.      Some discomfort and irritation may be noticed. Acetaminophen (Tylenol) or  Ibuprofen (Advil) may be taken for discomfort.      Avoid bending over, strenuous activity or heavy lifting for one week.      Keep the operated eye dry.      You may wash your hair, bathe or shower, but keep the operated eye closed while doing so.      Use medication exactly as prescribed by your doctor.  You may restart your regular home medications.      Bring all materials and medications to the clinic on your first post-operative visit.      Call the doctor s office if any of the following should occur:  -  any sudden vision change  -  nausea or severe headache  -  increase in pain not controlled  -  increased amount of floaters (black spots in front of vision)         -  or signs of infection (pus, increasing redness or tenderness)            Revised 12/10/2015    POST-OPERATIVE CARE FOLLOWING CATARACT SURGERY    DR. JT BROWNLEE  [facility]  [phone]    Postoperative medications: After surgery, you will use several different eye drop medications. You may have either the brand specific form or generic of each type used.     Begin your eye drops today as directed.  You should instill the drop, close the eye without blinking and keep closed for 3 minutes allowing the drop to absorb.  It is fine to instill all 3 of the drops consecutively, waiting the 3 minutes in between each one. Place the shield for sleep.  In the morning, instill 1 drop of all 3 eye drops before your post-op appointment.      Antibiotic    Ofloxacin - this generic antibiotic is to be used 4 times a day for one week, you can start using this drop after you get home, instilling 3 separate times on the day of surgery and then 4 times a day there after.     Anti-inflammatory       Ketorolac -this generic drop should be used 4 times daily until gone. You can start your drops when you get home, instilling 3 separate times on the day of surgery.     Steroid  Prednisolone - the generic form should be used 4 times daily for 3 weeks or  until gone. You can start your first drop after you get home, instilling 3 separate times on the day of surgery.        Do not rub the operated eye. Wear the eye shield during sleeping hours for one week.      Light sensitivity may be noticed. Sunglasses may be worn for comfort.      Some discomfort and irritation may be noticed. Acetaminophen (Tylenol) or Ibuprofen (Advil) may be taken for discomfort.      Avoid bending over, strenuous activity or heavy lifting for one week.      Keep the operated eye dry.      You may wash your hair, bathe or shower, but keep the operated eye closed while doing so.      Use medication exactly as prescribed by your doctor.  You may restart your regular home medications.      Bring all materials and medications to the clinic on your first post-operative visit.      Call the doctor s office if any of the following should occur:  -  any sudden vision change  -  nausea or severe headache  -  increase in pain not controlled  -  increased amount of floaters (black spots in front of vision)         -  or signs of infection (pus, increasing redness or tenderness)      [facility] Discharge  Instructions  Anesthesia (Eye Care Center)   Adult Discharge Instructions    For 24 hours after surgery    1. Get plenty of rest.  Make arrangements to have a responsible adult stay with you for at least 6 hours after you leave the hospital.  2. Do not drive or use heavy equipment for 24 hours.    3. Do not drink alcohol for 24 hours.  4. Do not sign legal documents or make important decisions for 24 hours.  5. Avoid strenuous or risky activities. You may feel lightheaded.  If so, sit for a few minutes before standing.  Have someone help you get up.   6. Conscious sedation patients may resume a regular diet..  7. Any questions of medical nature, call your physician.            Revised 12/10/2015

## 2017-04-26 NOTE — IP AVS SNAPSHOT
[facility]    [address]    [place] [address]    Phone:  [phone]    Fax:  [phone]                                       After Visit Summary   4/26/2017    David Nicole    MRN: [id number]           After Visit Summary Signature Page     I have received my discharge instructions, and my questions have been answered. I have discussed any challenges I see with this plan with the nurse or doctor.    ..........................................................................................................................................  Patient/Patient Representative Signature      ..........................................................................................................................................  Patient Representative Print Name and Relationship to Patient    ..................................................               ................................................  Date                                            Time    ..........................................................................................................................................  Reviewed by Signature/Title    ...................................................              ..............................................  Date                                                            Time

## 2017-04-26 NOTE — ANESTHESIA POSTPROCEDURE EVALUATION
Patient: David Nicole    Procedure(s):  LEFT EYE FEMTOSECOND LASER ASSISTED PHACOEMULSIFICATION CLEAR CORNEA WITH TORIC INTRAOCULAR LENS IMPLANT  - Wound Class: I-Clean    Diagnosis:LEFT EYE CATARACT  Diagnosis Additional Information: No value filed.    Anesthesia Type:  MAC    Note:  Anesthesia Post Evaluation    Patient location during evaluation: PACU  Patient participation: Able to fully participate in evaluation  Level of consciousness: awake  Pain management: adequate  Airway patency: patent  Cardiovascular status: acceptable  Respiratory status: acceptable  Hydration status: acceptable  PONV: none     Anesthetic complications: None          Last vitals:  Vitals:    04/26/17 0836 04/26/17 0933 04/26/17 0945   BP: (!) 133/92 104/76 116/79   Resp: 16 16 16   Temp: 36.1  C (97  F)     SpO2: 95% 95% 95%         Electronically Signed By: Peewee Hamlin MD  April 26, 2017  5:08 PM

## 2017-04-26 NOTE — OP NOTE
[facility]  Ophthalmology Operative Note    PREOPERATIVE DIAGNOSIS:  Dense cataract of the Left eye.       POSTOPERATIVE DIAGNOSIS:  Dense cataract of the Left eye.       OPERATION: Femtosecond Laser Assisted Clear corneal phacoemulsification with intraocular lens implant of the Left eye.       PROCEDURE: When the laser portion of the procedure was complete, David Nicole was brought into the operating room with a topical anesthetic.  Under the microscope after a sterile ophthalmic prep, a lid speculum was put into place.  Anterior chamber was entered with a #75 blade.  Anterior chamber was then filled with lidocaine solution and a viscoelastic material.  A keratome blade was then used to fashion a 2.6 mm temporal incision in the corneoscleral junction and the previously formed anterior capsulorrhexis was easily removed.  The lens was carefully hydrodissected.  The phacoemulsification tip was then introduced into the eye and a central groove fashioned.  The nucleus was split in half and then quartered, and nuclear material was aspirated.  The irrigation-aspiration apparatus was then utilized to clear the remaining cortical material.  The posterior capsule was polished and a foldable posterior chamber intraocular lens was then introduced into the capsular bag, unfolded, and rotated into the previously marked position.  No sutures were necessary to close the incision site.  The viscoelastic material was aspirated.  Drops of Moxeza and Prolensa were used on the surface of the eye and a clear shield was put over the eye before the patient was dismissed from the operating room.  The patient tolerated the procedure without difficulty.       Implant Name Type Inv. Item Serial No.  Lot No. LRB No. Used   EYE IMP IOL FREDERICK 1-PIECE TECNIS TORIC +16.5 [id number] Lens/Eye Implant EYE IMP IOL FREDERICK 1-PIECE TECNIS TORIC +16.5 [id number] [employer]   Left 1           JT MARTINEZ  MD [place]

## 2017-04-26 NOTE — IP AVS SNAPSHOT
MRN:[id number]                      After Visit Summary   4/26/2017    David Nicole    MRN: [id number]           Thank you!     Thank you for choosing [facility] for your care. Our goal is always to provide you with excellent care. Hearing back from our patients is one way we can continue to improve our services. Please take a few minutes to complete the written survey that you may receive in the mail after you visit with us. Thank you!        Patient Information     Date Of Birth          1945        About your hospital stay     You were admitted on:  April 26, 2017 You last received care in the:  [facility]    You were discharged on:  April 26, 2017       Who to Call     For medical emergencies, please call 911.  For non-urgent questions about your medical care, please call your primary care provider or clinic, [phone]  For questions related to your surgery, please call your surgery clinic        Attending Provider     Provider Specialty    Jt George MD Ophthalmology       Primary Care Provider Office Phone # Fax #    Silviaon Arnold [phone]       [employer] [facility] [address] [address]        Your next 10 appointments already scheduled     May 18, 2017  8:30 AM CDT   (Arrive by 8:15 AM)   New Prostate Cancer with Hermelindo oMtt MD   [facility] ([facility])    [address] [address] [phone]              Further instructions from your care team           POST-OPERATIVE CARE FOLLOWING CATARACT SURGERY    DR. JT GEORGE  [facility]  [phone]    Postoperative medications: After surgery, you will use several different eye drop medications. You may have either the brand specific form or generic of each type used.     Begin your eye drops today as directed.  You should instill the  drop, close the eye without blinking and keep closed for 3 minutes allowing the drop to absorb.  It is fine to instill all 3 of the drops consecutively, waiting the 3 minutes in between each one. Place the shield for sleep.  In the morning, instill 1 drop of all 3 eye drops before your post-op appointment.      Antibiotic  Moxeza - is an antibiotic drop that is used to minimize the risk of infection. Instill your first drop at bedtime tonight, then 2 times daily for one week.       -OR-    Ofloxacin - this generic antibiotic is to be used 4 times a day for one week, you can start using this drop after you get home, instilling 3 separate times on the day of surgery and then 4 times a day there after.     Anti-inflammatory   Prolensa - use it once daily until gone, beginning today.    -OR-    Ketorolac -this generic drop should be used 4 times daily until gone. You can start your drops when you get home, instilling 3 separate times on the day of surgery.     Steroid  Durezol - is a steroid drop used to minimize inflammation and modulate healing.  It should be used 2 times daily until gone, beginning with your first drop at bedtime tonight.    -OR-    Prednisolone - the generic form should be used 4 times daily for 3 weeks or until gone. You can start your first drop after you get home, instilling 3 separate times on the day of surgery.        Do not rub the operated eye. Wear the eye shield during sleeping hours for one week.      Light sensitivity may be noticed. Sunglasses may be worn for comfort.      Some discomfort and irritation may be noticed. Acetaminophen (Tylenol) or Ibuprofen (Advil) may be taken for discomfort.      Avoid bending over, strenuous activity or heavy lifting for one week.      Keep the operated eye dry.      You may wash your hair, bathe or shower, but keep the operated eye closed while doing so.      Use medication exactly as prescribed by your doctor.  You may restart your regular home  medications.      Bring all materials and medications to the clinic on your first post-operative visit.      Call the doctor s office if any of the following should occur:  -  any sudden vision change  -  nausea or severe headache  -  increase in pain not controlled  -  increased amount of floaters (black spots in front of vision)         -  or signs of infection (pus, increasing redness or tenderness)            Revised 12/10/2015    POST-OPERATIVE CARE FOLLOWING CATARACT SURGERY    DR. JT BROWNLEE  [facility]  [phone]    Postoperative medications: After surgery, you will use several different eye drop medications. You may have either the brand specific form or generic of each type used.     Begin your eye drops today as directed.  You should instill the drop, close the eye without blinking and keep closed for 3 minutes allowing the drop to absorb.  It is fine to instill all 3 of the drops consecutively, waiting the 3 minutes in between each one. Place the shield for sleep.  In the morning, instill 1 drop of all 3 eye drops before your post-op appointment.      Antibiotic    Ofloxacin - this generic antibiotic is to be used 4 times a day for one week, you can start using this drop after you get home, instilling 3 separate times on the day of surgery and then 4 times a day there after.     Anti-inflammatory       Ketorolac -this generic drop should be used 4 times daily until gone. You can start your drops when you get home, instilling 3 separate times on the day of surgery.     Steroid  Prednisolone - the generic form should be used 4 times daily for 3 weeks or until gone. You can start your first drop after you get home, instilling 3 separate times on the day of surgery.        Do not rub the operated eye. Wear the eye shield during sleeping hours for one week.      Light sensitivity may be noticed. Sunglasses may be worn for comfort.      Some discomfort and irritation may be noticed. Acetaminophen  (Tylenol) or Ibuprofen (Advil) may be taken for discomfort.      Avoid bending over, strenuous activity or heavy lifting for one week.      Keep the operated eye dry.      You may wash your hair, bathe or shower, but keep the operated eye closed while doing so.      Use medication exactly as prescribed by your doctor.  You may restart your regular home medications.      Bring all materials and medications to the clinic on your first post-operative visit.      Call the doctor s office if any of the following should occur:  -  any sudden vision change  -  nausea or severe headache  -  increase in pain not controlled  -  increased amount of floaters (black spots in front of vision)         -  or signs of infection (pus, increasing redness or tenderness)      [facility] Anesthesia [facility] Care [facility] Discharge  Instructions  Anesthesia (Eye Care Center)   Adult Discharge Instructions    For 24 hours after surgery    1. Get plenty of rest.  Make arrangements to have a responsible adult stay with you for at least 6 hours after you leave the hospital.  2. Do not drive or use heavy equipment for 24 hours.    3. Do not drink alcohol for 24 hours.  4. Do not sign legal documents or make important decisions for 24 hours.  5. Avoid strenuous or risky activities. You may feel lightheaded.  If so, sit for a few minutes before standing.  Have someone help you get up.   6. Conscious sedation patients may resume a regular diet..  7. Any questions of medical nature, call your physician.            Revised 12/10/2015    Pending Results     No orders found from 4/24/2017 to 4/27/2017.            Admission Information     Date & Time Provider Department Dept. Phone    4/26/2017 Harrison George MD [facility] [place] [phone]      Your Vitals Were     Blood Pressure Temperature Respirations Pulse Oximetry          104/76 97  F (36.1  C) (Temporal) 16 95%        [employer] Information     [employer] lets you send messages to  "your doctor, view your test results, renew your prescriptions, schedule appointments and more. To sign up, go to [email or web address] . Click on \"Log in\" on the left side of the screen, which will take you to the Welcome page. Then click on \"Sign up Now\" on the right side of the page.     You will be asked to enter the access code listed below, as well as some personal information. Please follow the directions to create your username and password.     Your access code is: [email or web address]  Expires: 2017 10:02 AM     Your access code will  in 90 days. If you need help or a new code, please call your [place] clinic or [phone].        Care EveryWhere ID     This is your Care EveryWhere ID. This could be used by other organizations to access your [facility] medical records  [id number]           Review of your medicines      CONTINUE these medicines which have NOT CHANGED        Dose / Directions    AMBIEN PO        Dose:  10 mg   Take 10 mg by mouth At Bedtime   Refills:  0       DAILY MULTIVITAMIN PO        Take  by mouth.   Refills:  0       FOSAMAX PO        Dose:  70 mg   Take 70 mg by mouth   Refills:  0       HYDROCHLOROTHIAZIDE PO        Dose:  25 mg   Take 25 mg by mouth daily   Refills:  0       VIBRAMYCIN PO        Dose:  50 mg   Take 50 mg by mouth daily   Refills:  0       vitamin D 2000 UNITS tablet        Dose:  1 tablet   Take 1 tablet by mouth daily.   Refills:  0                Protect others around you: Learn how to safely use, store and throw away your medicines at www.disposemymeds.org.             Medication List: This is a list of all your medications and when to take them. Check marks below indicate your daily home schedule. Keep this list as a reference.      Medications           Morning Afternoon Evening Bedtime As Needed    AMBIEN PO   Take 10 mg by mouth At Bedtime                                DAILY MULTIVITAMIN PO   Take  by mouth.                                FOSAMAX PO "   Take 70 mg by mouth                                HYDROCHLOROTHIAZIDE PO   Take 25 mg by mouth daily                                VIBRAMYCIN PO   Take 50 mg by mouth daily                                vitamin D 2000 UNITS tablet   Take 1 tablet by mouth daily.

## 2017-04-28 ENCOUNTER — TRANSFERRED RECORDS (OUTPATIENT)
Dept: HEALTH INFORMATION MANAGEMENT | Facility: CLINIC | Age: 72
End: 2017-04-28

## 2017-05-09 ENCOUNTER — PRE VISIT (OUTPATIENT)
Dept: UROLOGY | Facility: CLINIC | Age: 72
End: 2017-05-09

## 2017-05-09 NOTE — TELEPHONE ENCOUNTER
Patient with prostate cancer coming in for a second opinion. Chart reviewed and records have been sent to scanning. No need for a call.

## 2017-05-10 NOTE — OP NOTE
[facility]  Ophthalmology Operative Note    PREOPERATIVE DIAGNOSIS:  Dense cataract of the Left eye.       POSTOPERATIVE DIAGNOSIS:  Dense cataract of the Left eye.       OPERATION:  Femtosecond Laser-Assisted Clear corneal phacoemulsification with intraocular lens implant of the Left eye.       PROCEDURE:  When the laser portion of the procedure was completed,David Nicole was brought into the operating room with a topical anesthetic.  Under the microscope after a sterile ophthalmic prep, a lid speculum was put into place.  Anterior chamber was entered with a #75 blade.  Anterior chamber was then filled with lidocaine solution and a viscoelastic material.  A keratome blade was then used to fashion a 2.6 mm temporal incision in the corneoscleral junction and the previously formed anterior capsulorrhexis was easily removed.  The lens was carefully hydrodissected.  The phacoemulsification tip was then introduced into the eye and a central groove fashioned.  The nucleus was split in half and then quartered, and nuclear material was aspirated.  The irrigation-aspiration apparatus was then utilized to clear the remaining cortical material.  The posterior capsule was polished and a foldable posterior chamber intraocular lens was then introduced into the capsular bag, unfolded, and rotated into the horizontal position.  No sutures were necessary to close the incision site.  The viscoelastic material was aspirated.  Drops of Moxeza and Prolensa were used on the surface of the eye and a clear shield was put over the eye before the patient was dismissed from the operating room.  The patient tolerated the procedure without difficulty.       Implant Name Type Inv. Item Serial No.  Lot No. LRB No. Used   EYE IMP IOL FREDERICK 1-PIECE TECNIS TORIC +16.5 [id number] Lens/Eye Implant EYE IMP IOL FREDERICK 1-PIECE TECNIS TORIC +16.5 [id number] [employer]   Left 1           JT MARTINEZ  MD [place]

## 2017-05-18 ENCOUNTER — OFFICE VISIT (OUTPATIENT)
Dept: UROLOGY | Facility: CLINIC | Age: 72
End: 2017-05-18

## 2017-05-18 VITALS
WEIGHT: 198.4 LBS | BODY MASS INDEX: 30.07 KG/M2 | DIASTOLIC BLOOD PRESSURE: 84 MMHG | HEART RATE: 69 BPM | HEIGHT: 68 IN | SYSTOLIC BLOOD PRESSURE: 123 MMHG

## 2017-05-18 DIAGNOSIS — C61 MALIGNANT NEOPLASM OF PROSTATE (H): Primary | ICD-10-CM

## 2017-05-18 ASSESSMENT — PAIN SCALES - GENERAL: PAINLEVEL: NO PAIN (0)

## 2017-05-18 NOTE — PROGRESS NOTES
"Urology Clinic Note     Date: May 18, 2017  Time: 8:25 AM  Patient: David Nicole  MRN: [id number]     HPI/Subjective: David Nicole is a 71 year old man who presents for a second opinion of prostate cancer treatment. The patient was initially diagnosed by Dr. Richardson on 3/23/17. PSA at the time of diagnosis with 11.42. MRI showed 1 PIRADS 3 area in right transitional zone and 2 areas of PIRADS 3 in the right and left peripheral zone. He underwent TRUSB which showed 1 core with [place] 3+3 with <5% core involved and another core with [place] 4+3 involving 15% of the core without perineural invasion. He is hesitant to undergo prostatectomy because he would like to preserve his erectile function for as long as possible given that he's marrying a woman from [place] later this year. He states that he'd prefer to watch his PSA for another year over having the surgery.    PSA  1/3/17   11.42  9/2/16    9.11  7/26/15  9.41  7/20/15  6.48  1/2/15    6.99  6/30/14  6.71  12/17/13 5.87     PSH: Umbilical hernia repair     PMH: BPH    Social History:  at [place]. He has no children. Never been . Engaged to be  later this year.     Objective:  /84 (BP Location: Right arm, Cuff Size: Adult Large)  Pulse 69  Ht 1.715 m (5' 7.5\")  Wt 90 kg (198 lb 6.4 oz)  BMI 30.62 kg/m2  Gen: In NAD, conversant  Resp: Breathing non-labored  Abd: Soft, non-distended, non-tender.  Ext: Warm and well perfused  : 50 g prostate, phallus circumcised.      Imaging  MRI Pelvis 10/25/16    Assessment & Plan:    David Nicole is a 72 yo man with h/o Nickie 4 + 3 prostate cancer who presents for second opinion of prostate cancer. Given the presence of intermediate risk disease in this otherwise healthy 71 year old man, intervention is indicated at this time. Discussed the risks and benefits of available interventions, including surgery; brachytherapy and radiation.   - Bone scan  - Genetic " testing prostat  - F/u in 3 weeks to discuss results    ILaverne am acting as scribe for Dr. Hermelindo Mott MD.      Patient seen and examined by me.  I agree with the medical student's documentation of the encounter.   I obtained the history and examined the patient and student only acted as a scribe.  Total visit time 30 minutes with >50% spent in counseling  Hermelindo Mott MD      Answers for HPI/ROS submitted by the patient on 5/17/2017   General Symptoms: No  Skin Symptoms: No  HENT Symptoms: No  EYE SYMPTOMS: No  HEART SYMPTOMS: No  LUNG SYMPTOMS: No  INTESTINAL SYMPTOMS: No  URINARY SYMPTOMS: No  REPRODUCTIVE SYMPTOMS: Yes  SKELETAL SYMPTOMS: No  BLOOD SYMPTOMS: No  NERVOUS SYSTEM SYMPTOMS: No  MENTAL HEALTH SYMPTOMS: No  Scrotal pain or swelling: No  Erectile dysfunction: Yes  Penile discharge: No  Genital ulcers: No  Reduced libido: No

## 2017-05-18 NOTE — PATIENT INSTRUCTIONS
Schedule/complete imaging and follow up in 3 weeks with Dr Mott      It was a pleasure meeting with you today.  Thank you for allowing me and my team the privilege of caring for you today.  YOU are the reason we are here, and I truly hope we provided you with the excellent service you deserve.  Please let us know if there is anything else we can do for you so that we can be sure you are leaving completely satisfied with your care experience.

## 2017-05-18 NOTE — LETTER
"5/18/2017       RE: David Nicole  [address] [address]     Dear Colleague,    Thank you for referring your patient, David Nicole, to the [facility] UROLOGY AND INST FOR PROSTATE AND UROLOGIC CANCERS at [facility]. Please see a copy of my visit note below.    Urology Clinic Note     Date: May 18, 2017  Time: 8:25 AM  Patient: David Nicole  MRN: [id number]     HPI/Subjective: David Nicole is a 71 year old man who presents for a second opinion of prostate cancer treatment. The patient was initially diagnosed by Dr. Richardson on 3/23/17. PSA at the time of diagnosis with 11.42. MRI showed 1 PIRADS 3 area in right transitional zone and 2 areas of PIRADS 3 in the right and left peripheral zone. He underwent TRUSB which showed 1 core with Nickie 3+3 with <5% core involved and another core with Nickie 4+3 involving 15% of the core without perineural invasion. He is hesitant to undergo prostatectomy because he would like to preserve his erectile function for as long as possible given that he's marrying a woman from [place] later this year. He states that he'd prefer to watch his PSA for another year over having the surgery.    PSA  1/3/17   11.42  9/2/16    9.11  7/26/15  9.41  7/20/15  6.48  1/2/15    6.99  6/30/14  6.71  12/17/13 5.87     PSH: Umbilical hernia repair     PMH: BPH    Social History:  at [place]. He has no children. Never been . Engaged to be  later this year.     Objective:  /84 (BP Location: Right arm, Cuff Size: Adult Large)  Pulse 69  Ht 1.715 m (5' 7.5\")  Wt 90 kg (198 lb 6.4 oz)  BMI 30.62 kg/m2  Gen: In NAD, conversant  Resp: Breathing non-labored  Abd: Soft, non-distended, non-tender.  Ext: Warm and well perfused  : 50 g prostate, phallus circumcised.      Imaging  MRI Pelvis 10/25/16    Assessment & Plan:    David Nicole is a 70 yo man with h/o Nickie 4 + 3 " prostate cancer who presents for second opinion of prostate cancer. Given the presence of intermediate risk disease in this otherwise healthy 71 year old man, intervention is indicated at this time. Discussed the risks and benefits of available interventions, including surgery; brachytherapy and radiation.   - Bone scan  - Genetic testing prostat  - F/u in 3 weeks to discuss results    ILaverne am acting as scribe for Dr. Hermelindo Mott MD.      Patient seen and examined by me.  I agree with the medical student's documentation of the encounter.   I obtained the history and examined the patient and student only acted as a scribe.  Total visit time 30 minutes with >50% spent in counseling.    Hermelindo Mott MD

## 2017-05-18 NOTE — MR AVS SNAPSHOT
After Visit Summary   5/18/2017    David Nicole    MRN: [id number]           Patient Information     Date Of Birth          1945        Visit Information        Provider Department      5/18/2017 8:30 AM Hermelindo Mott MD [facility] Urology and [facility] for Prostate and Urologic Cancers        Today's Diagnoses     Malignant neoplasm of prostate (H)    -  1      Care Instructions    Schedule/complete imaging and follow up in 3 weeks with Dr Mott      It was a pleasure meeting with you today.  Thank you for allowing me and my team the privilege of caring for you today.  YOU are the reason we are here, and I truly hope we provided you with the excellent service you deserve.  Please let us know if there is anything else we can do for you so that we can be sure you are leaving completely satisfied with your care experience.                Follow-ups after your visit        Follow-up notes from your care team     Return in about 3 weeks (around 6/8/2017).      Your next 10 appointments already scheduled     Jul 10, 2017  9:00 AM CDT   NM INJECTION with [id number]   [facility], Nuclear Medicine ([facility])    [address] [place] [id number] [phone]            Jul 10, 2017 12:00 PM CDT   NM BONE SCAN WHOLE BODY with UUNM3   [facility], Nuclear Medicine ([facility])    [address] [place] [address] [phone]           Please bring a list of your medicines to the exam. (Include vitamins, minerals and over-the-counter drugs.) You should wear comfortable clothes. Leave your valuables at home. Please bring related prior results and films. Tell your doctor:   If you are breastfeeding or may be pregnant.   If you have had a barium test within the past few days. Barium may change the results of certain exams.   If you think you may need sedation (medicine to help  you relax).  You may eat and drink as normal.  Drink plenty of fluids and empty bladder frequently between injection and scans.            Jul 12, 2017 10:20 AM CDT   (Arrive by 10:05 AM)   Return Prostate Cancer with Hermelindo Mott MD   [facility] Urology and [facility] for Prostate and Urologic Cancers ([facility])    [address] [facility] [address] [phone]              Future tests that were ordered for you today     Open Future Orders        Priority Expected Expires Ordered    NM Bone Scan Whole Body Routine 5/18/2017 5/18/2018 5/18/2017            Who to contact     Please call your clinic at [phone] to:    Ask questions about your health    Make or cancel appointments    Discuss your medicines    Learn about your test results    Speak to your doctor   If you have compliments or concerns about an experience at your clinic, or if you wish to file a complaint, please contact [facility] Patient Relations at [phone] or email us at [email or web address]         Additional Information About Your Visit        Patient CommunicatorharSpotify Information     [employer] gives you secure access to your electronic health record. If you see a primary care provider, you can also send messages to your care team and make appointments. If you have questions, please call your primary care clinic.  If you do not have a primary care provider, please call [phone] and they will assist you.      [employer] is an electronic gateway that provides easy, online access to your medical records. With [employer], you can request a clinic appointment, read your test results, renew a prescription or communicate with your care team.     To access your existing account, please contact your [facility] Clinic or call [phone] for assistance.        Care EveryWhere ID     This is your Care EveryWhere ID. This could be used by other organizations  "to access your [facility] medical records  [id number]        Your Vitals Were     Pulse Height BMI (Body Mass Index)             69 1.715 m (5' 7.5\") 30.62 kg/m2          Blood Pressure from Last 3 Encounters:   05/18/17 123/84   04/26/17 116/79   04/05/17 132/82    Weight from Last 3 Encounters:   05/18/17 90 kg (198 lb 6.4 oz)   04/05/17 92.5 kg (204 lb)   10/22/15 99.8 kg (220 lb)              We Performed the Following     Decipher Prostate Cancer Test: Clinic Lab Order        Primary Care Provider Office Phone # Fax #    Silviano Arnold [phone]       [facility] [address] [address]        Thank you!     Thank you for choosing [facility] UROLOGY AND [facility] FOR PROSTATE AND UROLOGIC CANCERS  for your care. Our goal is always to provide you with excellent care. Hearing back from our patients is one way we can continue to improve our services. Please take a few minutes to complete the written survey that you may receive in the mail after your visit with us. Thank you!             Your Updated Medication List - Protect others around you: Learn how to safely use, store and throw away your medicines at www.disposemymeds.org.          This list is accurate as of: 5/18/17  9:55 AM.  Always use your most recent med list.                   Brand Name Dispense Instructions for use    AMBIEN PO      Take 10 mg by mouth At Bedtime       DAILY MULTIVITAMIN PO      Take  by mouth.       FOSAMAX PO      Take 70 mg by mouth       HYDROCHLOROTHIAZIDE PO      Take 25 mg by mouth daily       VIBRAMYCIN PO      Take 50 mg by mouth daily       vitamin D 2000 UNITS tablet      Take 1 tablet by mouth daily.         "

## 2017-06-08 ENCOUNTER — TRANSFERRED RECORDS (OUTPATIENT)
Dept: HEALTH INFORMATION MANAGEMENT | Facility: CLINIC | Age: 72
End: 2017-06-08

## 2017-06-30 ENCOUNTER — PRE VISIT (OUTPATIENT)
Dept: UROLOGY | Facility: CLINIC | Age: 72
End: 2017-06-30

## 2017-06-30 NOTE — TELEPHONE ENCOUNTER
Patient with prostate cancer coming in to review imaging. Chart reviewed and all appointments are scheduled. No need for a call.

## 2017-07-10 ENCOUNTER — HOSPITAL ENCOUNTER (OUTPATIENT)
Dept: NUCLEAR MEDICINE | Facility: CLINIC | Age: 72
Setting detail: NUCLEAR MEDICINE
Discharge: HOME | End: 2017-07-10
Attending: UROLOGY | Admitting: UROLOGY
Payer: MEDICARE

## 2017-07-10 ENCOUNTER — HOSPITAL ENCOUNTER (OUTPATIENT)
Dept: NUCLEAR MEDICINE | Facility: CLINIC | Age: 72
Setting detail: NUCLEAR MEDICINE
End: 2017-07-10
Attending: UROLOGY
Payer: MEDICARE

## 2017-07-10 DIAGNOSIS — C61 MALIGNANT NEOPLASM OF PROSTATE (H): ICD-10-CM

## 2017-07-10 PROCEDURE — 78306 BONE IMAGING WHOLE BODY: CPT

## 2017-07-10 PROCEDURE — A9503 TC99M MEDRONATE: HCPCS | Performed by: UROLOGY

## 2017-07-10 PROCEDURE — 34300033 ZZH RX 343: Performed by: UROLOGY

## 2017-07-10 RX ORDER — TC 99M MEDRONATE 20 MG/10ML
25 INJECTION, POWDER, LYOPHILIZED, FOR SOLUTION INTRAVENOUS ONCE
Status: COMPLETED | OUTPATIENT
Start: 2017-07-10 | End: 2017-07-10

## 2017-07-10 RX ADMIN — TC 99M MEDRONATE 24.8 MCI.: 20 INJECTION, POWDER, LYOPHILIZED, FOR SOLUTION INTRAVENOUS at 09:10

## 2017-07-12 ENCOUNTER — OFFICE VISIT (OUTPATIENT)
Dept: UROLOGY | Facility: CLINIC | Age: 72
End: 2017-07-12

## 2017-07-12 VITALS
WEIGHT: 192 LBS | SYSTOLIC BLOOD PRESSURE: 143 MMHG | BODY MASS INDEX: 29.1 KG/M2 | DIASTOLIC BLOOD PRESSURE: 80 MMHG | HEART RATE: 70 BPM | HEIGHT: 68 IN

## 2017-07-12 DIAGNOSIS — C61 MALIGNANT NEOPLASM OF PROSTATE (H): Primary | ICD-10-CM

## 2017-07-12 ASSESSMENT — PAIN SCALES - GENERAL: PAINLEVEL: NO PAIN (0)

## 2017-07-12 NOTE — NURSING NOTE
Chief Complaint   Patient presents with     RECHECK     prostate cancer coming in to review imaging       Dannielle Garza MA

## 2017-07-12 NOTE — MR AVS SNAPSHOT
After Visit Summary   7/12/2017    David Nicole    MRN: [id number]           Patient Information     Date Of Birth          1945        Visit Information        Provider Department      7/12/2017 10:20 AM Hermelindo Mott MD [facility] Urology and [facility] for Prostate and Urologic Cancers        Today's Diagnoses     Malignant neoplasm of prostate (H)    -  1      Care Instructions    Please follow up PRN          Follow-ups after your visit        Follow-up notes from your care team     Return if symptoms worsen or fail to improve.      Who to contact     Please call your clinic at [phone] to:    Ask questions about your health    Make or cancel appointments    Discuss your medicines    Learn about your test results    Speak to your doctor   If you have compliments or concerns about an experience at your clinic, or if you wish to file a complaint, please contact [facility] Patient Relations at [phone] or email us at [email or web address]         Additional Information About Your Visit        Brand[employer]itter[employer]art Information     [employer] gives you secure access to your electronic health record. If you see a primary care provider, you can also send messages to your care team and make appointments. If you have questions, please call your primary care clinic.  If you do not have a primary care provider, please call [phone] and they will assist you.      [employer] is an electronic gateway that provides easy, online access to your medical records. With [employer], you can request a clinic appointment, read your test results, renew a prescription or communicate with your care team.     To access your existing account, please contact your [facility] or call [phone] for assistance.        Care EveryWhere ID     This is your Care EveryWhere ID. This could be used by other organizations to access your [facility]  "records  [id number]        Your Vitals Were     Pulse Height BMI (Body Mass Index)             70 1.727 m (5' 8\") 29.19 kg/m2          Blood Pressure from Last 3 Encounters:   07/12/17 143/80   05/18/17 123/84   04/26/17 116/79    Weight from Last 3 Encounters:   07/12/17 87.1 kg (192 lb)   05/18/17 90 kg (198 lb 6.4 oz)   04/05/17 92.5 kg (204 lb)              Today, you had the following     No orders found for display       Primary Care Provider Office Phone # Fax #    Silviano Arnold [phone]       [facility] [address] [address]        Equal Access to Services     FBA CARLOS : Hadii mickey mondragon hadasho Soomaali, waaxda luqadaha, qaybta kaalmada adeegyada, kenton rhoades . So [facility] [phone].    ATENCIÓN: Si habla español, tiene a beltran disposición servicios gratuitos de asistencia lingüística. [facility] [phone].    We comply with applicable federal civil rights laws and [place] laws. We do not discriminate on the basis of race, color, national origin, age, disability sex, sexual orientation or gender identity.            Thank you!     Thank you for choosing [facility] UROLOGY AND [facility] FOR PROSTATE AND UROLOGIC CANCERS  for your care. Our goal is always to provide you with excellent care. Hearing back from our patients is one way we can continue to improve our services. Please take a few minutes to complete the written survey that you may receive in the mail after your visit with us. Thank you!             Your Updated Medication List - Protect others around you: Learn how to safely use, store and throw away your medicines at www.disposemymeds.org.          This list is accurate as of: 7/12/17  6:28 PM.  Always use your most recent med list.                   Brand Name Dispense Instructions for use Diagnosis    AMBIEN PO      Take 10 mg by mouth At Bedtime        DAILY MULTIVITAMIN PO      Take  by mouth.        FOSAMAX PO      Take " 70 mg by mouth        HYDROCHLOROTHIAZIDE PO      Take 25 mg by mouth daily        VIBRAMYCIN PO      Take 50 mg by mouth daily        vitamin D 2000 UNITS tablet      Take 1 tablet by mouth daily.

## 2017-07-12 NOTE — PROGRESS NOTES
"Urology Clinic Note     Date: 07/12/17   Time: 10:55 AM  Patient: David Nicole  MRN: [id number]     HPI/Subjective: David Nicole is a 71 year old man who initially presented for a second opinion of prostate cancer treatment. The patient was initially diagnosed by Dr. Richardson on 3/23/17. PSA at the time of diagnosis with 11.42. MRI showed 1 PIRADS 3 area in right transitional zone and 2 areas of PIRADS 3 in the right and left peripheral zone. He underwent TRUSB which showed 1 core with [place] 3+3 with <5% core involved and another core with Nickie 4+3 involving 15% of the core without perineural invasion.     He is hesitant to undergo prostatectomy and at the last visit alternative treatment options were discussed. At that time a bon scan was ordered and genetic testing was also ordered. The bone scan did not demonstrate any evidence of metastasis and the patient had a Decipher score of 0.58 (29.6% of high grade disease, 8.2% of 5-year Metastasis, 7.3% 10-year prostate cancer specific mortality).     PSA  1/3/17   11.42  9/2/16    9.11  7/26/15  9.41  7/20/15  6.48  1/2/15    6.99  6/30/14  6.71  12/17/13 5.87     PSH: Umbilical hernia repair     PMH: BPH    Social History:  at [place]. He has no children. Never been . Engaged to be  later this year.     Objective:  /80  Pulse 70  Ht 1.727 m (5' 8\")  Wt 87.1 kg (192 lb)  BMI 29.19 kg/m2  Gen: In NAD, conversant  Resp: Breathing non-labored  Abd: Soft, non-distended, non-tender.  Ext: Warm and well perfused  : 50 g prostate, circumcised.      Imaging  MRI Pelvis 10/25/16    Assessment & Plan:    David Nicole is a 72 yo man with h/o Nickie 4 + 3 prostate cancer who presents for second opinion of prostate cancer. Given the presence of intermediate risk disease in this otherwise healthy 71 year old man, intervention is indicated at this time. Bone scan was negative and Decipher score of 0.58 showed " 29.6% risk of high grade disease, 8.2% risk of 5-year Metastasis, 7.3% risk 10-year prostate cancer specific mortality. Patient is interested in radiation.    - Patient educated about the risks of radiation and the mortality comparison between surgery and radiation. The patient is currently undecided and would like time to make a definitive decision.  - Patient instructed to follow up PRN once he has determined which route of treatment he would like to pursue     Duarte Bailey MD   Urology Resident     Patient seen and examined with the resident.  Visit time 20 minutes and >50% spent in counseling.  I agree with the resident's note and plan of care.       Hermelindo Mott MD  Urology Staff

## 2017-07-12 NOTE — LETTER
"7/12/2017       RE: David Nicole  [address] [address]     Dear Colleague,    Thank you for referring your patient, David Nicole, to the [facility] UROLOGY AND INST FOR PROSTATE AND UROLOGIC CANCERS at [facility]. Please see a copy of my visit note below.    Urology Clinic Note     Date: 07/12/17   Time: 10:55 AM  Patient: David Nicole  MRN: [id number]     HPI/Subjective: David Nicole is a 71 year old man who initially presented for a second opinion of prostate cancer treatment. The patient was initially diagnosed by Dr. Richardson on 3/23/17. PSA at the time of diagnosis with 11.42. MRI showed 1 PIRADS 3 area in right transitional zone and 2 areas of PIRADS 3 in the right and left peripheral zone. He underwent TRUSB which showed 1 core with Nickie 3+3 with <5% core involved and another core with [place] 4+3 involving 15% of the core without perineural invasion.     He is hesitant to undergo prostatectomy and at the last visit alternative treatment options were discussed. At that time a bon scan was ordered and genetic testing was also ordered. The bone scan did not demonstrate any evidence of metastasis and the patient had a Decipher score of 0.58 (29.6% of high grade disease, 8.2% of 5-year Metastasis, 7.3% 10-year prostate cancer specific mortality).     PSA  1/3/17   11.42  9/2/16    9.11  7/26/15  9.41  7/20/15  6.48  1/2/15    6.99  6/30/14  6.71  12/17/13 5.87     PSH: Umbilical hernia repair     PMH: BPH    Social History:  at [place]. He has no children. Never been . Engaged to be  later this year.     Objective:  /80  Pulse 70  Ht 1.727 m (5' 8\")  Wt 87.1 kg (192 lb)  BMI 29.19 kg/m2  Gen: In NAD, conversant  Resp: Breathing non-labored  Abd: Soft, non-distended, non-tender.  Ext: Warm and well perfused  : 50 g prostate, circumcised.      Imaging  MRI Pelvis " 10/25/16    Assessment & Plan:    David Nicole is a 70 yo man with h/o Nickie 4 + 3 prostate cancer who presents for second opinion of prostate cancer. Given the presence of intermediate risk disease in this otherwise healthy 71 year old man, intervention is indicated at this time. Bone scan was negative and Decipher score of 0.58 showed 29.6% risk of high grade disease, 8.2% risk of 5-year Metastasis, 7.3% risk 10-year prostate cancer specific mortality. Patient is interested in radiation.    - Patient educated about the risks of radiation and the mortality comparison between surgery and radiation. The patient is currently undecided and would like time to make a definitive decision.  - Patient instructed to follow up PRN once he has determined which route of treatment he would like to pursue     Duarte Bailey MD   Urology Resident     Patient seen and examined with the resident.  Visit time 20 minutes and >50% spent in counseling.  I agree with the resident's note and plan of care.       Hermelindo Mott MD  Urology Staff

## 2017-07-28 ENCOUNTER — TRANSFERRED RECORDS (OUTPATIENT)
Dept: HEALTH INFORMATION MANAGEMENT | Facility: CLINIC | Age: 72
End: 2017-07-28

## 2017-10-02 ENCOUNTER — TRANSFERRED RECORDS (OUTPATIENT)
Dept: HEALTH INFORMATION MANAGEMENT | Facility: CLINIC | Age: 72
End: 2017-10-02

## 2017-11-10 ENCOUNTER — TRANSFERRED RECORDS (OUTPATIENT)
Dept: HEALTH INFORMATION MANAGEMENT | Facility: CLINIC | Age: 72
End: 2017-11-10

## 2019-10-04 ENCOUNTER — HEALTH MAINTENANCE LETTER (OUTPATIENT)
Age: 74
End: 2019-10-04

## 2020-02-08 ENCOUNTER — HEALTH MAINTENANCE LETTER (OUTPATIENT)
Age: 75
End: 2020-02-08

## 2020-11-08 ENCOUNTER — HEALTH MAINTENANCE LETTER (OUTPATIENT)
Age: 75
End: 2020-11-08

## 2021-03-28 ENCOUNTER — HEALTH MAINTENANCE LETTER (OUTPATIENT)
Age: 76
End: 2021-03-28

## 2021-09-11 ENCOUNTER — HEALTH MAINTENANCE LETTER (OUTPATIENT)
Age: 76
End: 2021-09-11

## 2022-04-23 ENCOUNTER — HEALTH MAINTENANCE LETTER (OUTPATIENT)
Age: 77
End: 2022-04-23

## 2022-10-30 ENCOUNTER — HEALTH MAINTENANCE LETTER (OUTPATIENT)
Age: 77
End: 2022-10-30

## 2023-06-01 ENCOUNTER — HEALTH MAINTENANCE LETTER (OUTPATIENT)
Age: 78
End: 2023-06-01

## 2024-01-01 ENCOUNTER — APPOINTMENT (OUTPATIENT)
Dept: CT IMAGING | Facility: CLINIC | Age: 79
End: 2024-01-01
Attending: FAMILY MEDICINE
Payer: MEDICARE

## 2024-01-01 ENCOUNTER — HEALTH MAINTENANCE LETTER (OUTPATIENT)
Age: 79
End: 2024-01-01

## 2024-01-01 ENCOUNTER — HOSPITAL ENCOUNTER (EMERGENCY)
Facility: CLINIC | Age: 79
Discharge: HOME | End: 2024-06-06
Attending: FAMILY MEDICINE | Admitting: FAMILY MEDICINE
Payer: MEDICARE

## 2024-01-01 VITALS
BODY MASS INDEX: 30.87 KG/M2 | OXYGEN SATURATION: 94 % | WEIGHT: 203 LBS | DIASTOLIC BLOOD PRESSURE: 85 MMHG | RESPIRATION RATE: 23 BRPM | TEMPERATURE: 98.8 F | SYSTOLIC BLOOD PRESSURE: 147 MMHG | HEART RATE: 100 BPM

## 2024-01-01 DIAGNOSIS — C78.7 COLON CANCER METASTASIZED TO LIVER (H): ICD-10-CM

## 2024-01-01 DIAGNOSIS — Z79.01 ON CONTINUOUS ORAL ANTICOAGULATION: ICD-10-CM

## 2024-01-01 DIAGNOSIS — D72.829 LEUKOCYTOSIS, UNSPECIFIED TYPE: ICD-10-CM

## 2024-01-01 DIAGNOSIS — E83.42 HYPOMAGNESEMIA: ICD-10-CM

## 2024-01-01 DIAGNOSIS — C18.9 COLON CANCER METASTASIZED TO LIVER (H): ICD-10-CM

## 2024-01-01 DIAGNOSIS — I10 PRIMARY HYPERTENSION: ICD-10-CM

## 2024-01-01 LAB
ALBUMIN SERPL BCG-MCNC: 3.1 G/DL (ref 3.5–5.2)
ALBUMIN UR-MCNC: 10 MG/DL
ALP SERPL-CCNC: 470 U/L (ref 40–150)
ALT SERPL W P-5'-P-CCNC: 69 U/L (ref 0–70)
ANION GAP SERPL CALCULATED.3IONS-SCNC: 10 MMOL/L (ref 7–15)
APPEARANCE UR: CLEAR
APTT PPP: 33 SECONDS (ref 22–38)
AST SERPL W P-5'-P-CCNC: 172 U/L (ref 0–45)
BACTERIA BLD CULT: NO GROWTH
BACTERIA BLD CULT: NO GROWTH
BASOPHILS # BLD AUTO: 0.1 10E3/UL (ref 0–0.2)
BASOPHILS NFR BLD AUTO: 0 %
BILIRUB DIRECT SERPL-MCNC: 1.78 MG/DL (ref 0–0.3)
BILIRUB SERPL-MCNC: 3 MG/DL
BILIRUB UR QL STRIP: NEGATIVE
BUN SERPL-MCNC: 12.1 MG/DL (ref 8–23)
CALCIUM SERPL-MCNC: 7.7 MG/DL (ref 8.8–10.2)
CHLORIDE SERPL-SCNC: 100 MMOL/L (ref 98–107)
COLOR UR AUTO: YELLOW
CREAT SERPL-MCNC: 0.49 MG/DL (ref 0.67–1.17)
DEPRECATED HCO3 PLAS-SCNC: 18 MMOL/L (ref 22–29)
EGFRCR SERPLBLD CKD-EPI 2021: >90 ML/MIN/1.73M2
EOSINOPHIL # BLD AUTO: 0 10E3/UL (ref 0–0.7)
EOSINOPHIL NFR BLD AUTO: 0 %
ERYTHROCYTE [DISTWIDTH] IN BLOOD BY AUTOMATED COUNT: 16.4 % (ref 10–15)
FLUAV RNA SPEC QL NAA+PROBE: NEGATIVE
FLUBV RNA RESP QL NAA+PROBE: NEGATIVE
GLUCOSE SERPL-MCNC: 106 MG/DL (ref 70–99)
GLUCOSE UR STRIP-MCNC: NEGATIVE MG/DL
HCT VFR BLD AUTO: 33.9 % (ref 40–53)
HGB BLD-MCNC: 11.8 G/DL (ref 13.3–17.7)
HGB UR QL STRIP: ABNORMAL
IMM GRANULOCYTES # BLD: 0.1 10E3/UL
IMM GRANULOCYTES NFR BLD: 1 %
INR PPP: 1.51 (ref 0.85–1.15)
INR PPP: >10 (ref 0.85–1.15)
KETONES UR STRIP-MCNC: ABNORMAL MG/DL
LACTATE SERPL-SCNC: 2 MMOL/L (ref 0.7–2)
LEUKOCYTE ESTERASE UR QL STRIP: NEGATIVE
LIPASE SERPL-CCNC: 29 U/L (ref 13–60)
LYMPHOCYTES # BLD AUTO: 0.4 10E3/UL (ref 0.8–5.3)
LYMPHOCYTES NFR BLD AUTO: 2 %
MAGNESIUM SERPL-MCNC: 1.4 MG/DL (ref 1.7–2.3)
MCH RBC QN AUTO: 32.5 PG (ref 26.5–33)
MCHC RBC AUTO-ENTMCNC: 34.8 G/DL (ref 31.5–36.5)
MCV RBC AUTO: 93 FL (ref 78–100)
MONOCYTES # BLD AUTO: 1.4 10E3/UL (ref 0–1.3)
MONOCYTES NFR BLD AUTO: 7 %
NEUTROPHILS # BLD AUTO: 17.5 10E3/UL (ref 1.6–8.3)
NEUTROPHILS NFR BLD AUTO: 90 %
NITRATE UR QL: NEGATIVE
NRBC # BLD AUTO: 0 10E3/UL
NRBC BLD AUTO-RTO: 0 /100
PH UR STRIP: 6.5 [PH] (ref 5–7)
PLATELET # BLD AUTO: 127 10E3/UL (ref 150–450)
POTASSIUM SERPL-SCNC: 4.1 MMOL/L (ref 3.4–5.3)
PROCALCITONIN SERPL IA-MCNC: 0.59 NG/ML
PROT SERPL-MCNC: 5.8 G/DL (ref 6.4–8.3)
RBC # BLD AUTO: 3.63 10E6/UL (ref 4.4–5.9)
RBC URINE: 3 /HPF
RSV RNA SPEC NAA+PROBE: NEGATIVE
SARS-COV-2 RNA RESP QL NAA+PROBE: NEGATIVE
SODIUM SERPL-SCNC: 128 MMOL/L (ref 135–145)
SP GR UR STRIP: 1.03 (ref 1–1.03)
TROPONIN T SERPL HS-MCNC: 15 NG/L
UROBILINOGEN UR STRIP-MCNC: <2 MG/DL
WBC # BLD AUTO: 19.4 10E3/UL (ref 4–11)
WBC URINE: 1 /HPF

## 2024-01-01 PROCEDURE — 84484 ASSAY OF TROPONIN QUANT: CPT | Performed by: FAMILY MEDICINE

## 2024-01-01 PROCEDURE — 87637 SARSCOV2&INF A&B&RSV AMP PRB: CPT | Performed by: FAMILY MEDICINE

## 2024-01-01 PROCEDURE — 96361 HYDRATE IV INFUSION ADD-ON: CPT

## 2024-01-01 PROCEDURE — 250N000011 HC RX IP 250 OP 636: Performed by: FAMILY MEDICINE

## 2024-01-01 PROCEDURE — 80053 COMPREHEN METABOLIC PANEL: CPT | Performed by: FAMILY MEDICINE

## 2024-01-01 PROCEDURE — 83605 ASSAY OF LACTIC ACID: CPT | Performed by: FAMILY MEDICINE

## 2024-01-01 PROCEDURE — G1010 CDSM STANSON: HCPCS

## 2024-01-01 PROCEDURE — 87040 BLOOD CULTURE FOR BACTERIA: CPT | Performed by: FAMILY MEDICINE

## 2024-01-01 PROCEDURE — 81001 URINALYSIS AUTO W/SCOPE: CPT | Performed by: FAMILY MEDICINE

## 2024-01-01 PROCEDURE — 250N000011 HC RX IP 250 OP 636: Performed by: EMERGENCY MEDICINE

## 2024-01-01 PROCEDURE — 74177 CT ABD & PELVIS W/CONTRAST: CPT | Mod: MA

## 2024-01-01 PROCEDURE — 83690 ASSAY OF LIPASE: CPT | Performed by: FAMILY MEDICINE

## 2024-01-01 PROCEDURE — 83735 ASSAY OF MAGNESIUM: CPT | Performed by: FAMILY MEDICINE

## 2024-01-01 PROCEDURE — 85730 THROMBOPLASTIN TIME PARTIAL: CPT | Performed by: FAMILY MEDICINE

## 2024-01-01 PROCEDURE — 99285 EMERGENCY DEPT VISIT HI MDM: CPT | Mod: 25

## 2024-01-01 PROCEDURE — 85610 PROTHROMBIN TIME: CPT | Mod: 91 | Performed by: FAMILY MEDICINE

## 2024-01-01 PROCEDURE — 82248 BILIRUBIN DIRECT: CPT | Performed by: FAMILY MEDICINE

## 2024-01-01 PROCEDURE — 258N000003 HC RX IP 258 OP 636: Performed by: FAMILY MEDICINE

## 2024-01-01 PROCEDURE — 84145 PROCALCITONIN (PCT): CPT | Performed by: FAMILY MEDICINE

## 2024-01-01 PROCEDURE — 36415 COLL VENOUS BLD VENIPUNCTURE: CPT | Performed by: FAMILY MEDICINE

## 2024-01-01 PROCEDURE — 93005 ELECTROCARDIOGRAM TRACING: CPT | Performed by: EMERGENCY MEDICINE

## 2024-01-01 PROCEDURE — 96365 THER/PROPH/DIAG IV INF INIT: CPT | Mod: 59

## 2024-01-01 PROCEDURE — 85025 COMPLETE CBC W/AUTO DIFF WBC: CPT | Performed by: FAMILY MEDICINE

## 2024-01-01 RX ORDER — ACETAMINOPHEN 325 MG/1
650 TABLET ORAL ONCE
Status: COMPLETED | OUTPATIENT
Start: 2024-01-01 | End: 2024-01-01

## 2024-01-01 RX ORDER — IOPAMIDOL 755 MG/ML
100 INJECTION, SOLUTION INTRAVASCULAR ONCE
Status: COMPLETED | OUTPATIENT
Start: 2024-01-01 | End: 2024-01-01

## 2024-01-01 RX ORDER — HEPARIN SODIUM (PORCINE) LOCK FLUSH IV SOLN 100 UNIT/ML 100 UNIT/ML
5-10 SOLUTION INTRAVENOUS ONCE
Status: COMPLETED | OUTPATIENT
Start: 2024-01-01 | End: 2024-01-01

## 2024-01-01 RX ORDER — MAGNESIUM SULFATE HEPTAHYDRATE 40 MG/ML
2 INJECTION, SOLUTION INTRAVENOUS ONCE
Status: COMPLETED | OUTPATIENT
Start: 2024-01-01 | End: 2024-01-01

## 2024-01-01 RX ADMIN — HEPARIN 5 ML: 100 SYRINGE at 00:23

## 2024-01-01 RX ADMIN — SODIUM CHLORIDE 1000 ML: 9 INJECTION, SOLUTION INTRAVENOUS at 19:49

## 2024-01-01 RX ADMIN — MAGNESIUM SULFATE HEPTAHYDRATE 2 G: 40 INJECTION, SOLUTION INTRAVENOUS at 21:26

## 2024-01-01 RX ADMIN — SODIUM CHLORIDE 1000 ML: 9 INJECTION, SOLUTION INTRAVENOUS at 22:41

## 2024-01-01 RX ADMIN — IOPAMIDOL 100 ML: 755 INJECTION, SOLUTION INTRAVENOUS at 21:18

## 2024-01-01 ASSESSMENT — COLUMBIA-SUICIDE SEVERITY RATING SCALE - C-SSRS
2. HAVE YOU ACTUALLY HAD ANY THOUGHTS OF KILLING YOURSELF IN THE PAST MONTH?: NO
1. IN THE PAST MONTH, HAVE YOU WISHED YOU WERE DEAD OR WISHED YOU COULD GO TO SLEEP AND NOT WAKE UP?: NO
6. HAVE YOU EVER DONE ANYTHING, STARTED TO DO ANYTHING, OR PREPARED TO DO ANYTHING TO END YOUR LIFE?: NO

## 2024-01-01 ASSESSMENT — ACTIVITIES OF DAILY LIVING (ADL)
ADLS_ACUITY_SCORE: 35
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
## 2024-06-06 NOTE — ED NOTES
"Patient's wife continually states that we are trying to kill patient by providing ordered care per MD.  Multiple staff have reassured her that we are trying to find the cause of his current illness and help him.  Patient stated to wife, \"Just let them do their job.\"  Wife continually coming out of room and states the nurse and doctor need to remain at bedside.  She also touched sterile field after being told multiple times not to when writer was accessing patient's chest port to grab things and look at them as she states she doesn't trust us.  Much reassurance given to patient's wife that we are here to help him.  Patient states continue care and is his own person.  "

## 2024-07-05 LAB
ATRIAL RATE - MUSE: 115 BPM
DIASTOLIC BLOOD PRESSURE - MUSE: 92 MMHG
INTERPRETATION ECG - MUSE: NORMAL
P AXIS - MUSE: 44 DEGREES
PR INTERVAL - MUSE: 142 MS
QRS DURATION - MUSE: 68 MS
QT - MUSE: 334 MS
QTC - MUSE: 462 MS
R AXIS - MUSE: -37 DEGREES
SYSTOLIC BLOOD PRESSURE - MUSE: 154 MMHG
T AXIS - MUSE: 18 DEGREES
VENTRICULAR RATE- MUSE: 115 BPM

## (undated) DEVICE — DRAPE SHEET REV FOLD 3/4 9349

## (undated) DEVICE — EYE KNIFE SLIT XSTAR VISITEC 2.6MM 45DEG 373726

## (undated) DEVICE — EYE SHIELD PLASTIC

## (undated) DEVICE — EYE SOL BSS 500ML

## (undated) DEVICE — GLOVE PROTEXIS MICRO 7.0  2D73PM70

## (undated) DEVICE — GLOVE PROTEXIS MICRO 8.0  2D73PM80

## (undated) DEVICE — EYE PACK CUSTOM ANTERIOR 45DEG TIP CENTURION PPK6925-01

## (undated) DEVICE — EYE PACK BVI READYPAK KIT #1

## (undated) DEVICE — GLOVE PROTEXIS MICRO 6.0  2D73PM60

## (undated) DEVICE — PACK CATARACT CUSTOM SO DALE SEY32CTFCX

## (undated) DEVICE — LINEN TOWEL PACK X5 5464

## (undated) DEVICE — GLOVE PROTEXIS MICRO 6.5  2D73PM65

## (undated) RX ORDER — ONDANSETRON 2 MG/ML
INJECTION INTRAMUSCULAR; INTRAVENOUS
Status: DISPENSED
Start: 2017-04-05

## (undated) RX ORDER — ONDANSETRON 2 MG/ML
INJECTION INTRAMUSCULAR; INTRAVENOUS
Status: DISPENSED
Start: 2017-04-26